# Patient Record
Sex: FEMALE | Race: WHITE | NOT HISPANIC OR LATINO | Employment: OTHER | URBAN - METROPOLITAN AREA
[De-identification: names, ages, dates, MRNs, and addresses within clinical notes are randomized per-mention and may not be internally consistent; named-entity substitution may affect disease eponyms.]

---

## 2017-01-03 DIAGNOSIS — F43.9 SITUATIONAL STRESS: ICD-10-CM

## 2017-01-03 RX ORDER — CLONAZEPAM 1 MG/1
TABLET ORAL
Qty: 75 TABLET | Refills: 0 | OUTPATIENT
Start: 2017-01-03 | End: 2017-02-02 | Stop reason: SDUPTHER

## 2017-01-12 DIAGNOSIS — F43.9 SITUATIONAL STRESS: ICD-10-CM

## 2017-01-12 RX ORDER — CARBAMAZEPINE 200 MG/1
TABLET ORAL
Qty: 60 TABLET | Refills: 4 | Status: SHIPPED | OUTPATIENT
Start: 2017-01-12 | End: 2017-06-06 | Stop reason: SDUPTHER

## 2017-01-24 ENCOUNTER — LAB (OUTPATIENT)
Dept: LAB | Facility: HOSPITAL | Age: 62
End: 2017-01-24

## 2017-01-24 DIAGNOSIS — D50.8 IRON DEFICIENCY ANEMIA SECONDARY TO INADEQUATE DIETARY IRON INTAKE: ICD-10-CM

## 2017-01-24 DIAGNOSIS — D64.9 ANEMIA, UNSPECIFIED TYPE: ICD-10-CM

## 2017-01-24 DIAGNOSIS — G89.4 CHRONIC PAIN SYNDROME: ICD-10-CM

## 2017-01-24 LAB
DEPRECATED RDW RBC AUTO: 59.4 FL (ref 37–49)
ERYTHROCYTE [DISTWIDTH] IN BLOOD BY AUTOMATED COUNT: 16.7 % (ref 11.7–14.5)
FERRITIN SERPL-MCNC: 209.1 NG/ML (ref 11–207)
HCT VFR BLD AUTO: 42.6 % (ref 34–45)
HGB BLD-MCNC: 13.9 G/DL (ref 11.5–14.9)
HGB RETIC QN: 37.7 PG (ref 29.8–36.1)
IMM RETICS NFR: 5.2 % (ref 3–15.8)
IRON 24H UR-MRATE: 127 MCG/DL (ref 37–145)
IRON SATN MFR SERPL: 40 % (ref 14–48)
MCH RBC QN AUTO: 31.2 PG (ref 27–33)
MCHC RBC AUTO-ENTMCNC: 32.6 G/DL (ref 32–35)
MCV RBC AUTO: 95.7 FL (ref 83–97)
PLATELET # BLD AUTO: 214 10*3/MM3 (ref 150–375)
PMV BLD AUTO: 9.8 FL (ref 8.9–12.1)
RBC # BLD AUTO: 4.45 10*6/MM3 (ref 3.9–5)
RETICS/RBC NFR AUTO: 1.14 % (ref 0.6–2)
TIBC SERPL-MCNC: 314 MCG/DL (ref 249–505)
TRANSFERRIN SERPL-MCNC: 224 MG/DL (ref 200–360)
WBC NRBC COR # BLD: 4.39 10*3/MM3 (ref 4–10)

## 2017-01-24 PROCEDURE — 84466 ASSAY OF TRANSFERRIN: CPT | Performed by: INTERNAL MEDICINE

## 2017-01-24 PROCEDURE — 36415 COLL VENOUS BLD VENIPUNCTURE: CPT | Performed by: INTERNAL MEDICINE

## 2017-01-24 PROCEDURE — 82728 ASSAY OF FERRITIN: CPT | Performed by: INTERNAL MEDICINE

## 2017-01-24 PROCEDURE — 85046 RETICYTE/HGB CONCENTRATE: CPT | Performed by: INTERNAL MEDICINE

## 2017-01-24 PROCEDURE — 83540 ASSAY OF IRON: CPT | Performed by: INTERNAL MEDICINE

## 2017-01-24 PROCEDURE — 85027 COMPLETE CBC AUTOMATED: CPT | Performed by: INTERNAL MEDICINE

## 2017-01-24 NOTE — TELEPHONE ENCOUNTER
----- Message from Tracy Deluna sent at 1/24/2017 11:20 AM EST -----  Refill on pain meds   hydrocodone

## 2017-01-25 RX ORDER — HYDROCODONE BITARTRATE AND ACETAMINOPHEN 10; 325 MG/1; MG/1
1 TABLET ORAL EVERY 4 HOURS PRN
Qty: 160 TABLET | Refills: 0 | Status: SHIPPED | OUTPATIENT
Start: 2017-01-25 | End: 2017-02-13 | Stop reason: SDUPTHER

## 2017-01-31 ENCOUNTER — APPOINTMENT (OUTPATIENT)
Dept: LAB | Facility: HOSPITAL | Age: 62
End: 2017-01-31

## 2017-01-31 ENCOUNTER — OFFICE VISIT (OUTPATIENT)
Dept: ONCOLOGY | Facility: CLINIC | Age: 62
End: 2017-01-31

## 2017-01-31 VITALS
WEIGHT: 179.2 LBS | HEIGHT: 64 IN | DIASTOLIC BLOOD PRESSURE: 70 MMHG | HEART RATE: 62 BPM | TEMPERATURE: 98.1 F | BODY MASS INDEX: 30.59 KG/M2 | RESPIRATION RATE: 16 BRPM | SYSTOLIC BLOOD PRESSURE: 110 MMHG

## 2017-01-31 DIAGNOSIS — D50.8 IRON DEFICIENCY ANEMIA SECONDARY TO INADEQUATE DIETARY IRON INTAKE: Primary | ICD-10-CM

## 2017-01-31 PROCEDURE — 99213 OFFICE O/P EST LOW 20 MIN: CPT | Performed by: INTERNAL MEDICINE

## 2017-01-31 PROCEDURE — G0463 HOSPITAL OUTPT CLINIC VISIT: HCPCS | Performed by: INTERNAL MEDICINE

## 2017-01-31 NOTE — PROGRESS NOTES
Subjective .     REASON FOR FOLLOWUP :   Iron deficiency anemia    HISTORY OF PRESENT ILLNESS:  The patient is a 61 y.o. year old female  who is here for follow-up with the above-mentioned history.    Denies chest pain.  Denies shortness of air.  No change in baseline dizziness.  Energy level improved some after IV iron.  Denies bleeding from any location.      Past Medical History   Diagnosis Date   • Anemia    • Anxiety    • Arthritis    • Atrial fibrillation    • Bipolar disorder    • Bleeding disorder    • Bleeding nose    • Cataract    • Chronic fatigue    • Diabetes mellitus      treatment no longer needed for DM since weight loss surgery, diet controlled   • Diverticulitis    • Gallbladder disease    • Gastric ulcer    • GERD (gastroesophageal reflux disease)    • H/O seasonal allergies    • H/O transfusion of packed red blood cells    • Hiatal hernia    • History of CT scan of abdomen 05/18/2009     NO SIGNIFICAN FINDINGS, POST CHOLECYSTECTOMY, PREVIOUS GASTRIC BYPASS AND HERNIA REPAIR   • Hyperlipidemia    • Hypertension    • Peptic ulceration 2013   • Pleuritic chest pain    • Sinusitis    • Sleep apnea    • Stroke    • TIA (transient ischemic attack)    • Vitamin D deficiency      Past Surgical History   Procedure Laterality Date   • Upper gastrointestinal endoscopy  05/06/2015     PERFORMED BY DR. ROBYN LOPEZ   • Gastric bypass  2002     03/01/2014   • Cataract extraction  2010   • Colonoscopy  07/24/2009     TICS, NBIH   • Endoscopy  07/24/2009     GASTRIC BYPASS W/ MEDIUM POUCH AND INTACT STAPLE LINE, GASTRIC ULCER W/ CLEAN BASE   • Bilateral breast reduction  1976   • Hip fracture surgery  10/01/2009   • Endoscopy N/A 4/8/2016     Procedure: ESOPHAGOGASTRODUODENOSCOPY WITH BALLOON DILATION  15-18 MM   AND  BIOPSIES;  Surgeon: Robyn Lopez MD;  Location: [facility] ENDOSCOPY;  Service:    • Hernia repair  2008; 2014     Dr. Madera   • Cholecystectomy  2005   • Tonsillectomy  1985   • Joint replacement  Right 06/02/2011     knee   • Joint replacement       2009/2010       HEMATOLOGIC/ONCOLOGIC HISTORY:  (History from previous dates can be found in the separate document.)    MEDICATIONS    Current Outpatient Prescriptions:   •  B Complex-C (SUPER B COMPLEX PO), Take  by mouth Daily., Disp: , Rfl:   •  cholecalciferol (VITAMIN D3) 400 UNITS tablet, Take 400 Units by mouth Daily., Disp: , Rfl:   •  clonazePAM (KlonoPIN) 1 MG tablet, TAKE ONE TABLET BY MOUTH THREE TIMES A DAY AS NEEDED FOR SEIZURES, Disp: 75 tablet, Rfl: 0  •  clopidogrel (PLAVIX) 75 MG tablet, TAKE ONE TABLET BY MOUTH DAILY, Disp: 30 tablet, Rfl: 4  •  docusate sodium (COLACE) 100 MG capsule, Take 100 mg by mouth 3 (three) times a day as needed for constipation., Disp: , Rfl:   •  EPITOL 200 MG tablet, TAKE ONE TABLET BY MOUTH TWICE A DAY, Disp: 60 tablet, Rfl: 4  •  FLUoxetine (PROZAC) 20 MG capsule, Take 3 capsules by mouth Daily., Disp: 90 capsule, Rfl: 3  •  HYDROcodone-acetaminophen (NORCO)  MG per tablet, Take 1 tablet by mouth Every 4 (Four) Hours As Needed for moderate pain (4-6)., Disp: 160 tablet, Rfl: 0  •  meloxicam (MOBIC) 7.5 MG tablet, Take 1 tablet by mouth daily., Disp: 60 tablet, Rfl: 5  •  metoprolol tartrate (LOPRESSOR) 25 MG tablet, TAKE ONE TABLET BY MOUTH TWICE A DAY, Disp: 180 tablet, Rfl: 1  •  naproxen sodium (ALEVE) 220 MG tablet, Take 220 mg by mouth 2 (Two) Times a Day As Needed for mild pain (1-3)., Disp: , Rfl:   •  promethazine (PHENERGAN) 25 MG tablet, Take  by mouth., Disp: , Rfl:   •  sore muscle (ICY HOT EXTRA STRENGTH) 10-30 % cream cream, Apply  topically As Needed., Disp: , Rfl:     ALLERGIES:   No Known Allergies    SOCIAL HISTORY:       Social History     Social History   • Marital status:      Spouse name: Rah   • Number of children: N/A   • Years of education: College     Occupational History   • [employer]     Social History Main Topics   • Smoking status: Never Smoker   •  "Smokeless tobacco: Never Used   • Alcohol use Yes      Comment: social   • Drug use: No   • Sexual activity: Defer     Other Topics Concern   • Not on file     Social History Narrative         FAMILY HISTORY:  Family History   Problem Relation Age of Onset   • Colon cancer Mother 74   • Other Mother      POLYP   • Colon cancer Father 69   • Other Father      POLYP   • Diabetes Father    • Other Sister 52     POLYP   • Colon cancer Paternal Grandfather    • Cancer Paternal Grandfather    • Lung cancer Maternal Grandfather    • Prostate cancer Maternal Grandfather    • Cancer Maternal Grandfather    • Cancer Maternal Uncle        REVIEW OF SYSTEMS:  GENERAL: No change in appetite or weight;   No fevers, chills, sweats.    SKIN: No nonhealing lesions.   No rashes.  HEME/LYMPH: No easy bruising, bleeding.   No swollen nodes.   EYES: No vision changes or diplopia.   ENT: No tinnitus, hearing loss, gum bleeding, epistaxis, hoarseness or dysphagia.   RESPIRATORY: No cough, shortness of breath, hemoptysis or wheezing.   CVS: No chest pain, palpitations, orthopnea, dyspnea on exertion or PND.   GI: No melena or hematochezia.   No abdominal pain.  No nausea, vomiting, constipation, diarrhea  : No lower tract obstructive symptoms, dysuria or hematuria.   MUSCULOSKELETAL: No bone pain.  No joint stiffness.   NEUROLOGICAL: see HPI   PSYCHIATRIC: No increased nervousness, mood changes or depression.      Objective    Vitals:    01/31/17 1052   BP: 110/70   Pulse: 62   Resp: 16   Temp: 98.1 °F (36.7 °C)   Weight: 179 lb 3.2 oz (81.3 kg)   Height: 63.78\" (162 cm)   PainSc: 7  Comment: pain comes from her hips     Current Status 1/31/2017   ECOG score 0      PHYSICAL EXAM:    GENERAL:  Well-developed, well-nourished in no acute distress.   SKIN:  Scabbed areas on her body.    EYES:  Pupils equal, round and reactive to light.  EOMs intact.  Conjunctivae normal.  EARS:  Hearing intact.  NOSE:  Septum midline.  No excoriations or " nasal discharge.  MOUTH:  Tongue is well-papillated; no stomatitis or ulcers.  Lips normal.  THROAT:  Oropharynx without lesions or exudates.  NECK:  Supple with good range of motion; no thyromegaly or masses, no JVD.  LYMPHATICS:  No cervical, supraclavicular, axillary or inguinal adenopathy.  CHEST:  Lungs clear to auscultation. Good airflow.  CARDIAC:  Regular rate and rhythm without murmurs, rubs or gallops. Normal S1,S2.  ABDOMEN:  Soft, nontender with no hepatosplenomegaly or masses.  EXTREMITIES:  No clubbing, cyanosis or edema.  NEUROLOGICAL:  Cranial Nerves II-XII grossly intact.  No focal neurological deficits.  PSYCHIATRIC:  Normal affect and mood.    RECENT LABS:        WBC   Date Value Ref Range Status   01/24/2017 4.39 4.00 - 10.00 10*3/mm3 Final   12/06/2016 4.64 4.00 - 10.00 10*3/mm3 Final   09/15/2016 5.78 4.50 - 10.70 10*3/mm3 Final   03/02/2016 4.31 (L) 4.50 - 10.70 K/Cumm Final   03/01/2016 5.53 4.50 - 10.70 K/Cumm Final   02/29/2016 6.55 4.50 - 10.70 K/Cumm Final   10/07/2015 8.28 4.50 - 10.70 K/Cumm Final   05/06/2015 5.82 4.50 - 10.70 K/Cumm Final   05/05/2015 6.17 4.50 - 10.70 K/Cumm Final   05/03/2015 5.68 4.50 - 10.70 K/Cumm Final   05/02/2015 5.84 4.50 - 10.70 K/Cumm Final     HEMOGLOBIN   Date Value Ref Range Status   01/24/2017 13.9 11.5 - 14.9 g/dL Final   12/06/2016 10.5 (L) 11.5 - 14.9 g/dL Final   09/15/2016 11.7 (L) 11.9 - 15.5 g/dL Final   03/02/2016 10.6 (L) 11.9 - 15.5 g/dL Final   03/01/2016 10.7 (L) 11.9 - 15.5 g/dL Final   02/29/2016 11.2 (L) 11.9 - 15.5 g/dL Final   10/07/2015 10.4 (L) 11.9 - 15.5 g/dL Final   05/06/2015 11.6 (L) 11.9 - 15.5 g/dL Final   05/05/2015 11.4 (L) 11.9 - 15.5 g/dL Final   05/03/2015 10.0 (L) 11.9 - 15.5 g/dL Final   05/02/2015 10.3 (L) 11.9 - 15.5 g/dL Final     PLATELETS   Date Value Ref Range Status   01/24/2017 214 150 - 375 10*3/mm3 Final   12/06/2016 319 150 - 375 10*3/mm3 Final   09/15/2016 343 140 - 500 10*3/mm3 Final   03/02/2016 287 140 -  500 K/Cumm Final   03/01/2016 324 140 - 500 K/Cumm Final   02/29/2016 388 140 - 500 K/Cumm Final   10/07/2015 254 140 - 500 K/Cumm Final   05/06/2015 295 140 - 500 K/Cumm Final   05/05/2015 316 140 - 500 K/Cumm Final   05/03/2015 267 140 - 500 K/Cumm Final   05/02/2015 253 140 - 500 K/Cumm Final       Assessment/Plan   There are no diagnoses linked to this encounter.  1.  Iron deficiency anemia.  Cannot tolerate oral iron due to constipation.  Likely poor absorption due to gastric bypass 2003.   2 doses Feraheme December 2016 improved energy some.  Recent iron labs and hemoglobin normal.    2.  Source of iron deficiency.  Suspected to be due to poor absorption from gastric bypass 2003.  She sees Dr. Lopez/Kym regularly.  States she is up-to-date on EGD and colonoscopy.    3.  Gastric bypass 2003.  Anemia could be multifactorial.  B12 normal today.  Await folate.    4.  On a prior visit, she had skin lesions due to picking at her skin as she was nervous for the initial consult.  No complaints of this today.  I noticed no skin problems today.     Plan  M.D. 4 months with iron labs 1 week prior

## 2017-02-02 DIAGNOSIS — F43.9 SITUATIONAL STRESS: ICD-10-CM

## 2017-02-03 RX ORDER — CLONAZEPAM 1 MG/1
TABLET ORAL
Qty: 75 TABLET | Refills: 0 | OUTPATIENT
Start: 2017-02-03 | End: 2017-02-13 | Stop reason: SDUPTHER

## 2017-02-06 RX ORDER — CLOPIDOGREL BISULFATE 75 MG/1
TABLET ORAL
Qty: 30 TABLET | Refills: 3 | Status: SHIPPED | OUTPATIENT
Start: 2017-02-06 | End: 2017-06-15 | Stop reason: SDUPTHER

## 2017-02-13 ENCOUNTER — OFFICE VISIT (OUTPATIENT)
Dept: FAMILY MEDICINE CLINIC | Facility: CLINIC | Age: 62
End: 2017-02-13

## 2017-02-13 VITALS
WEIGHT: 180 LBS | HEART RATE: 50 BPM | TEMPERATURE: 97.7 F | BODY MASS INDEX: 30.73 KG/M2 | SYSTOLIC BLOOD PRESSURE: 118 MMHG | DIASTOLIC BLOOD PRESSURE: 68 MMHG | OXYGEN SATURATION: 97 % | HEIGHT: 64 IN

## 2017-02-13 DIAGNOSIS — E55.9 VITAMIN D DEFICIENCY: ICD-10-CM

## 2017-02-13 DIAGNOSIS — R53.82 CHRONIC FATIGUE: ICD-10-CM

## 2017-02-13 DIAGNOSIS — D50.0 IRON DEFICIENCY ANEMIA DUE TO CHRONIC BLOOD LOSS: ICD-10-CM

## 2017-02-13 DIAGNOSIS — I10 ESSENTIAL HYPERTENSION: ICD-10-CM

## 2017-02-13 DIAGNOSIS — E11.9 CONTROLLED TYPE 2 DIABETES MELLITUS WITHOUT COMPLICATION, WITHOUT LONG-TERM CURRENT USE OF INSULIN (HCC): ICD-10-CM

## 2017-02-13 DIAGNOSIS — E78.5 HYPERLIPIDEMIA, UNSPECIFIED HYPERLIPIDEMIA TYPE: Primary | ICD-10-CM

## 2017-02-13 DIAGNOSIS — Z79.899 ENCOUNTER FOR LONG-TERM (CURRENT) USE OF MEDICATIONS: ICD-10-CM

## 2017-02-13 DIAGNOSIS — F43.9 SITUATIONAL STRESS: ICD-10-CM

## 2017-02-13 DIAGNOSIS — I48.91 ATRIAL FIBRILLATION, UNSPECIFIED TYPE (HCC): Primary | ICD-10-CM

## 2017-02-13 DIAGNOSIS — G89.4 CHRONIC PAIN SYNDROME: ICD-10-CM

## 2017-02-13 DIAGNOSIS — K21.9 GASTROESOPHAGEAL REFLUX DISEASE WITHOUT ESOPHAGITIS: ICD-10-CM

## 2017-02-13 DIAGNOSIS — E78.49 OTHER HYPERLIPIDEMIA: ICD-10-CM

## 2017-02-13 DIAGNOSIS — R53.83 OTHER FATIGUE: ICD-10-CM

## 2017-02-13 PROCEDURE — 96372 THER/PROPH/DIAG INJ SC/IM: CPT | Performed by: INTERNAL MEDICINE

## 2017-02-13 PROCEDURE — 99214 OFFICE O/P EST MOD 30 MIN: CPT | Performed by: INTERNAL MEDICINE

## 2017-02-13 RX ORDER — KETOROLAC TROMETHAMINE 30 MG/ML
60 INJECTION, SOLUTION INTRAMUSCULAR; INTRAVENOUS ONCE
Status: COMPLETED | OUTPATIENT
Start: 2017-02-13 | End: 2017-02-13

## 2017-02-13 RX ORDER — ZOLPIDEM TARTRATE 5 MG/1
5 TABLET ORAL NIGHTLY PRN
Qty: 5 TABLET | Refills: 0 | Status: SHIPPED | OUTPATIENT
Start: 2017-02-13 | End: 2017-03-02 | Stop reason: SDUPTHER

## 2017-02-13 RX ORDER — CLONAZEPAM 1 MG/1
TABLET ORAL
Qty: 75 TABLET | Refills: 0 | Status: SHIPPED | OUTPATIENT
Start: 2017-02-13 | End: 2017-03-02 | Stop reason: SDUPTHER

## 2017-02-13 RX ORDER — HYDROCODONE BITARTRATE AND ACETAMINOPHEN 10; 325 MG/1; MG/1
1 TABLET ORAL EVERY 4 HOURS PRN
Qty: 160 TABLET | Refills: 0 | Status: SHIPPED | OUTPATIENT
Start: 2017-02-13 | End: 2017-04-24 | Stop reason: SDUPTHER

## 2017-02-13 RX ORDER — KETOROLAC TROMETHAMINE 30 MG/ML
30 INJECTION, SOLUTION INTRAMUSCULAR; INTRAVENOUS ONCE
Qty: 1 ML | Refills: 0 | Status: SHIPPED | OUTPATIENT
Start: 2017-02-13 | End: 2017-02-13

## 2017-02-13 RX ORDER — HYDROCODONE BITARTRATE AND ACETAMINOPHEN 10; 325 MG/1; MG/1
1 TABLET ORAL EVERY 4 HOURS PRN
Qty: 160 TABLET | Refills: 0 | Status: SHIPPED | OUTPATIENT
Start: 2017-02-13 | End: 2017-02-13 | Stop reason: SDUPTHER

## 2017-02-13 RX ADMIN — KETOROLAC TROMETHAMINE 60 MG: 30 INJECTION, SOLUTION INTRAMUSCULAR; INTRAVENOUS at 17:17

## 2017-02-13 NOTE — PROGRESS NOTES
Milady Wilson is a 61 y.o. female   Chief Complaint   Patient presents with   • Diabetes     3 month check up        BONNY Pizano: Per House Bill #1 Requirements and [place] Board of Medical Licensure Regulations for prescribing of Schedule II and Schedule III with Hydrocodone, and other controlled medications for which the Board requires [facility] reporting and regulation, the following drug treatment plan was developed and reviewed with the patient on the date of this encounter:              Controlled medication(s) taken: Norco 10, Klonipen 1mg, and Ambien (small amount)          Medical Indication (including pain relief and/or other physical and psychoosocial functional issue) for treatment: Pain, Anxiety, Insomnia          Further Diagnostic tests, consultations, or treatments needed: Labs ordered today            Plans for review of plan, adjustment and waning dose and further [facility] evaluation include:Quarterly [facility]                  Risk for medication abuse for this patient based on physician review is felt to be extremely low.    Subjective   History of Present Illness     Milady Wilson is a 61 y.o.female who presents with:follow up on anemia.  Had IV iron and feels like it has helped her a lot.  Felt a little better but does feel some better.  Can not sleep well at night.  Wants Ambien 5 mg #5 per month so she can occasionally get a good nights sleep.      The following portions of the patient's history were reviewed and updated as appropriate: past medical history, surgeries, family history, allergies, current medications, past social history and problem list.    A comprehensive review of 14 systems was peformed  Review of Systems   Constitutional: Negative.  Negative for chills, fatigue, fever and unexpected weight change.   HENT: Negative.  Negative for ear pain, hearing loss, sinus pressure, sore throat and tinnitus.    Eyes: Negative.  Negative for pain, discharge and redness.  "  Respiratory: Negative.  Negative for cough, shortness of breath and wheezing.    Cardiovascular: Negative.  Negative for chest pain, palpitations and leg swelling.   Gastrointestinal: Negative.  Negative for abdominal pain, constipation, diarrhea and nausea.   Endocrine: Negative.  Negative for cold intolerance and heat intolerance.   Genitourinary: Negative.  Negative for difficulty urinating, flank pain and urgency.   Musculoskeletal: Negative.  Negative for back pain, joint swelling and myalgias.   Skin: Negative.  Negative for rash and wound.   Allergic/Immunologic: Negative.  Negative for environmental allergies and food allergies.   Neurological: Negative.  Negative for dizziness, seizures, numbness and headaches.   Hematological: Negative.  Negative for adenopathy. Does not bruise/bleed easily.   Psychiatric/Behavioral: Negative.  Negative for decreased concentration, dysphoric mood and sleep disturbance. The patient is not nervous/anxious.    All other systems reviewed and are negative.      I have reviewed the patient's medical history in detail and updated the computerized patient record.      Objective   Vitals:    02/13/17 1612   BP: 118/68   BP Location: Right arm   Patient Position: Sitting   Cuff Size: Adult   Pulse: 50   Temp: 97.7 °F (36.5 °C)   TempSrc: Oral   SpO2: 97%   Weight: 180 lb (81.6 kg)   Height: 63.78\" (162 cm)   PainSc:   8   PainLoc: Hip         Physical Exam   Constitutional: She appears well-developed and well-nourished.   HENT:   Head: Normocephalic and atraumatic.   Right Ear: External ear normal.   Left Ear: External ear normal.   Nose: Nose normal.   Mouth/Throat: Oropharynx is clear and moist.   Eyes: Conjunctivae and EOM are normal. Pupils are equal, round, and reactive to light.   Neck: Normal range of motion. Neck supple.   Cardiovascular: Normal rate, regular rhythm, normal heart sounds and intact distal pulses.    Pulmonary/Chest: Effort normal and breath sounds normal. "   Abdominal: Soft. Bowel sounds are normal.   Musculoskeletal: Normal range of motion.   Neurological: She is alert. She has normal reflexes.   Skin: Skin is warm and dry.   Color looks better   Psychiatric: She has a normal mood and affect. Her behavior is normal. Judgment and thought content normal.   Nursing note and vitals reviewed.      Procedures    Reviewed old notes from [facility]                        Assessment/Plan     Diagnoses and all orders for this visit:    Atrial fibrillation, unspecified type  Comments:  Intermittent ongoing  Follow up as planned    Chronic pain syndrome  Comments:  refill pain med  BONNY reviewed  Orders:  -     Discontinue: HYDROcodone-acetaminophen (NORCO)  MG per tablet; Take 1 tablet by mouth Every 4 (Four) Hours As Needed for moderate pain (4-6).  -     HYDROcodone-acetaminophen (NORCO)  MG per tablet; Take 1 tablet by mouth Every 4 (Four) Hours As Needed for moderate pain (4-6).    Situational stress  Comments:  Klonipen prn  Monnitor closely    Orders:  -     clonazePAM (KlonoPIN) 1 MG tablet; Take one three times daily as needed.  #75=1 month supply    Controlled type 2 diabetes mellitus without complication, without long-term current use of insulin  Comments:  Continue same meds  Follow up as planned    Other fatigue  Comments:  Follow up as planneed  Doiing well at present time    Other hyperlipidemia  Comments:  check labs  Encouraged food sdieting    Gastroesophageal reflux disease without esophagitis  Comments:  Stable at present time    Other orders  -     ketorolac (TORADOL) 30 MG/ML injection; Infuse 30 mg into a venous catheter 1 (One) Time for 1 dose.  -     zolpidem (AMBIEN) 5 MG tablet; Take 1 tablet by mouth At Night As Needed for sleep.         Jackson Parkinson MD  2/13/2017  4:16 PM

## 2017-02-13 NOTE — PATIENT INSTRUCTIONS
Diagnoses and all orders for this visit:    Atrial fibrillation, unspecified type  Comments:  Intermittent ongoing  Follow up as planned    Chronic pain syndrome  Comments:  refill pain med  BONNY reviewed  Orders:  -     Discontinue: HYDROcodone-acetaminophen (NORCO)  MG per tablet; Take 1 tablet by mouth Every 4 (Four) Hours As Needed for moderate pain (4-6).  -     HYDROcodone-acetaminophen (NORCO)  MG per tablet; Take 1 tablet by mouth Every 4 (Four) Hours As Needed for moderate pain (4-6).    Situational stress  Comments:  Klonipen prn  Monnitor closely    Orders:  -     clonazePAM (KlonoPIN) 1 MG tablet; Take one three times daily as needed.  #75=1 month supply    Controlled type 2 diabetes mellitus without complication, without long-term current use of insulin  Comments:  Continue same meds  Follow up as planned    Other fatigue  Comments:  Follow up as planneed  Doiing well at present time    Other hyperlipidemia  Comments:  check labs  Encouraged food sdieting    Gastroesophageal reflux disease without esophagitis  Comments:  Stable at present time    Other orders  -     ketorolac (TORADOL) 30 MG/ML injection; Infuse 30 mg into a venous catheter 1 (One) Time for 1 dose.  -     zolpidem (AMBIEN) 5 MG tablet; Take 1 tablet by mouth At Night As Needed for sleep.

## 2017-02-15 LAB
25(OH)D3+25(OH)D2 SERPL-MCNC: 27.9 NG/ML (ref 30–100)
ALBUMIN SERPL-MCNC: 4.4 G/DL (ref 3.5–5.2)
ALBUMIN/GLOB SERPL: 1.6 G/DL
ALP SERPL-CCNC: 104 U/L (ref 39–117)
ALT SERPL-CCNC: 178 U/L (ref 1–33)
AST SERPL-CCNC: 484 U/L (ref 1–32)
BILIRUB SERPL-MCNC: 0.4 MG/DL (ref 0.1–1.2)
BUN SERPL-MCNC: 21 MG/DL (ref 6–20)
BUN/CREAT SERPL: 18.1 (ref 7–25)
CALCIUM SERPL-MCNC: 10.1 MG/DL (ref 8.6–10.5)
CHLORIDE SERPL-SCNC: 100 MMOL/L (ref 98–107)
CHOLEST SERPL-MCNC: 218 MG/DL (ref 0–200)
CO2 SERPL-SCNC: 25.6 MMOL/L (ref 22–29)
CREAT SERPL-MCNC: 1.16 MG/DL (ref 0.57–1)
FT4I SERPL CALC-MCNC: 1.4 (ref 1.2–4.9)
GLOBULIN SER CALC-MCNC: 2.7 GM/DL
GLUCOSE SERPL-MCNC: 87 MG/DL (ref 65–99)
HDLC SERPL-MCNC: 68 MG/DL (ref 40–60)
LDLC SERPL CALC-MCNC: 104 MG/DL (ref 0–100)
LDLC/HDLC SERPL: 1.53 {RATIO}
POTASSIUM SERPL-SCNC: 5.2 MMOL/L (ref 3.5–5.2)
PROT SERPL-MCNC: 7.1 G/DL (ref 6–8.5)
SODIUM SERPL-SCNC: 143 MMOL/L (ref 136–145)
T3RU NFR SERPL: 27 % (ref 24–39)
T4 SERPL-MCNC: 5 UG/DL (ref 4.5–12)
TRIGL SERPL-MCNC: 231 MG/DL (ref 0–150)
TSH SERPL DL<=0.005 MIU/L-ACNC: 3.71 UIU/ML (ref 0.45–4.5)
VLDLC SERPL CALC-MCNC: 46.2 MG/DL (ref 5–40)

## 2017-02-24 DIAGNOSIS — E11.9 CONTROLLED TYPE 2 DIABETES MELLITUS WITHOUT COMPLICATION, WITHOUT LONG-TERM CURRENT USE OF INSULIN (HCC): ICD-10-CM

## 2017-02-24 DIAGNOSIS — R79.89 ABNORMAL LIVER FUNCTION TESTS: Primary | ICD-10-CM

## 2017-02-24 DIAGNOSIS — Z79.899 ENCOUNTER FOR LONG-TERM (CURRENT) USE OF MEDICATIONS: ICD-10-CM

## 2017-02-27 ENCOUNTER — TELEPHONE (OUTPATIENT)
Dept: FAMILY MEDICINE CLINIC | Facility: CLINIC | Age: 62
End: 2017-02-27

## 2017-02-27 NOTE — TELEPHONE ENCOUNTER
Patient called, again re. Need for Stat CMP and Acetaminophen level to go to nearest [facility] and have these done patient called Fri. 2/24/17 @ 5:47 no answer left message on voicemail to go to [facility] and have stat labs done explained on message these needed to be done ASAP or STAT

## 2017-02-28 ENCOUNTER — TELEPHONE (OUTPATIENT)
Dept: FAMILY MEDICINE CLINIC | Facility: CLINIC | Age: 62
End: 2017-02-28

## 2017-02-28 NOTE — TELEPHONE ENCOUNTER
----- Message from Jaclyn Zepeda sent at 2/28/2017  3:04 PM EST -----  Regarding: rx request  promethazine (PHENERGAN) 25 MG table   and   Diverticulitis anti-biotic (pt did not supply name)     lov 2/13/17  nkda  Send to     [address] AT [address] [place] [address] [phone] [facility] [phone]

## 2017-03-02 DIAGNOSIS — F43.9 SITUATIONAL STRESS: ICD-10-CM

## 2017-03-02 RX ORDER — ZOLPIDEM TARTRATE 5 MG/1
TABLET ORAL
Qty: 5 TABLET | Refills: 0 | Status: SHIPPED | OUTPATIENT
Start: 2017-03-02 | End: 2017-04-29 | Stop reason: DRUGHIGH

## 2017-03-02 RX ORDER — CLONAZEPAM 1 MG/1
TABLET ORAL
Qty: 75 TABLET | Refills: 0 | OUTPATIENT
Start: 2017-03-02 | End: 2017-04-29 | Stop reason: SDUPTHER

## 2017-03-06 LAB
ALBUMIN SERPL-MCNC: 4.1 G/DL (ref 3.5–5.5)
ALBUMIN/GLOB SERPL: 1.6 {RATIO} (ref 1.1–2.5)
ALP SERPL-CCNC: 101 IU/L (ref 39–117)
ALT SERPL-CCNC: 83 IU/L (ref 0–32)
APAP SERPL-MCNC: NEGATIVE UG/ML (ref 10–30)
AST SERPL-CCNC: 51 IU/L (ref 0–40)
BILIRUB SERPL-MCNC: <0.2 MG/DL (ref 0–1.2)
BUN SERPL-MCNC: 15 MG/DL (ref 6–24)
BUN/CREAT SERPL: 23 (ref 9–23)
CALCIUM SERPL-MCNC: 10 MG/DL (ref 8.7–10.2)
CHLORIDE SERPL-SCNC: 100 MMOL/L (ref 96–106)
CO2 SERPL-SCNC: 23 MMOL/L (ref 18–29)
CREAT SERPL-MCNC: 0.66 MG/DL (ref 0.57–1)
GLOBULIN SER CALC-MCNC: 2.5 G/DL (ref 1.5–4.5)
GLUCOSE SERPL-MCNC: 110 MG/DL (ref 65–99)
POTASSIUM SERPL-SCNC: 5.2 MMOL/L (ref 3.5–5.2)
PROT SERPL-MCNC: 6.6 G/DL (ref 6–8.5)
SODIUM SERPL-SCNC: 142 MMOL/L (ref 134–144)

## 2017-04-17 DIAGNOSIS — F31.11 BIPOLAR AFFECTIVE DISORDER, CURRENTLY MANIC, MILD (HCC): ICD-10-CM

## 2017-04-17 RX ORDER — FLUOXETINE HYDROCHLORIDE 20 MG/1
CAPSULE ORAL
Qty: 90 CAPSULE | Refills: 0 | Status: SHIPPED | OUTPATIENT
Start: 2017-04-17 | End: 2017-05-23 | Stop reason: SDUPTHER

## 2017-04-24 ENCOUNTER — TELEPHONE (OUTPATIENT)
Dept: FAMILY MEDICINE CLINIC | Facility: CLINIC | Age: 62
End: 2017-04-24

## 2017-04-24 DIAGNOSIS — G89.4 CHRONIC PAIN SYNDROME: ICD-10-CM

## 2017-04-24 RX ORDER — HYDROCODONE BITARTRATE AND ACETAMINOPHEN 10; 325 MG/1; MG/1
1 TABLET ORAL EVERY 4 HOURS PRN
Qty: 160 TABLET | Refills: 0 | Status: SHIPPED | OUTPATIENT
Start: 2017-04-24 | End: 2017-04-29 | Stop reason: SDUPTHER

## 2017-04-24 NOTE — TELEPHONE ENCOUNTER
----- Message from Kaycee Argueta sent at 4/24/2017 11:06 AM EDT -----  Regarding: SCRIPT  Contact: [phone]  LDS: 2/13/17  NEXT APPT: 6/5/17    PATIENT NEEDS NEW SCRIPT FOR HYDROcodone-acetaminophen (NORCO)  MG per tablet. THERE IS ALSO ANOTHER MESSAGE FOR DR OLIVAS TO GIVE HER A CALL.    PATIENT INFORMED TO GIVE  24 HRS FOR SCRIPT.    THANK  YOU  Last office visit 2/13/17  Last fill 2/13/17  Last kimberley 2/14/17  Next appt6/5/17

## 2017-04-24 NOTE — TELEPHONE ENCOUNTER
----- Message from Kaycee Argueta sent at 4/24/2017 11:06 AM EDT -----  Regarding: SCRIPT  Contact: [phone]  LDS: 2/13/17  NEXT APPT: 6/5/17    PATIENT NEEDS NEW SCRIPT FOR HYDROcodone-acetaminophen (NORCO)  MG per tablet. THERE IS ALSO ANOTHER MESSAGE FOR DR OLIVAS TO GIVE HER A CALL.    PATIENT INFORMED TO GIVE  24 HRS FOR SCRIPT.    THANK  YOU      Patient called had episode where she pasted out hit head went to [facility]

## 2017-04-24 NOTE — TELEPHONE ENCOUNTER
----- Message from Kaycee Argueta sent at 4/24/2017 11:06 AM EDT -----  Regarding: SCRIPT  Contact: [phone]  LDS: 2/13/17  NEXT APPT: 6/5/17    PATIENT NEEDS NEW SCRIPT FOR HYDROcodone-acetaminophen (NORCO)  MG per tablet. THERE IS ALSO ANOTHER MESSAGE FOR DR OLIVAS TO GIVE HER A CALL.    PATIENT INFORMED TO GIVE  24 HRS FOR SCRIPT.    THANK  YOU  Message sent

## 2017-04-24 NOTE — TELEPHONE ENCOUNTER
----- Message from Tracy Deluna sent at 4/24/2017 11:42 AM EDT -----  Patient left [facility]   Against doctors orders  - would like too speak with you      [phone]            Patient called she had a syncope episode fell hit head wants to talk to Dr Pugh  Message sent

## 2017-04-25 NOTE — TELEPHONE ENCOUNTER
Spoke with patient on phone    Left AMA due to md who failed to listen to her    BP drops at times    Jackson Parkinson MD

## 2017-04-29 ENCOUNTER — OFFICE VISIT (OUTPATIENT)
Dept: FAMILY MEDICINE CLINIC | Facility: CLINIC | Age: 62
End: 2017-04-29

## 2017-04-29 VITALS
HEART RATE: 42 BPM | TEMPERATURE: 97.8 F | SYSTOLIC BLOOD PRESSURE: 110 MMHG | DIASTOLIC BLOOD PRESSURE: 74 MMHG | BODY MASS INDEX: 30.59 KG/M2 | WEIGHT: 177 LBS | OXYGEN SATURATION: 98 %

## 2017-04-29 DIAGNOSIS — I48.20 CHRONIC ATRIAL FIBRILLATION (HCC): ICD-10-CM

## 2017-04-29 DIAGNOSIS — E55.9 VITAMIN D DEFICIENCY: ICD-10-CM

## 2017-04-29 DIAGNOSIS — K21.00 GASTROESOPHAGEAL REFLUX DISEASE WITH ESOPHAGITIS: ICD-10-CM

## 2017-04-29 DIAGNOSIS — Z79.899 ENCOUNTER FOR LONG-TERM (CURRENT) USE OF MEDICATIONS: ICD-10-CM

## 2017-04-29 DIAGNOSIS — G89.4 CHRONIC PAIN SYNDROME: ICD-10-CM

## 2017-04-29 DIAGNOSIS — F43.9 SITUATIONAL STRESS: ICD-10-CM

## 2017-04-29 DIAGNOSIS — E11.9 CONTROLLED TYPE 2 DIABETES MELLITUS WITHOUT COMPLICATION, WITHOUT LONG-TERM CURRENT USE OF INSULIN (HCC): ICD-10-CM

## 2017-04-29 DIAGNOSIS — I10 ESSENTIAL HYPERTENSION: ICD-10-CM

## 2017-04-29 DIAGNOSIS — E78.5 HYPERLIPIDEMIA, UNSPECIFIED HYPERLIPIDEMIA TYPE: Primary | ICD-10-CM

## 2017-04-29 DIAGNOSIS — F31.0 BIPOLAR AFFECTIVE DISORDER, CURRENT EPISODE HYPOMANIC (HCC): ICD-10-CM

## 2017-04-29 PROCEDURE — 99214 OFFICE O/P EST MOD 30 MIN: CPT | Performed by: INTERNAL MEDICINE

## 2017-04-29 RX ORDER — CLONAZEPAM 1 MG/1
1 TABLET ORAL 3 TIMES DAILY PRN
Qty: 75 TABLET | Refills: 0 | Status: SHIPPED | OUTPATIENT
Start: 2017-04-29 | End: 2017-05-23 | Stop reason: SDUPTHER

## 2017-04-29 RX ORDER — HYDROCODONE BITARTRATE AND ACETAMINOPHEN 10; 325 MG/1; MG/1
1 TABLET ORAL EVERY 4 HOURS PRN
Qty: 160 TABLET | Refills: 0 | Status: SHIPPED | OUTPATIENT
Start: 2017-04-29 | End: 2017-06-05 | Stop reason: SDUPTHER

## 2017-04-29 RX ORDER — ZOLPIDEM TARTRATE 10 MG/1
10 TABLET ORAL NIGHTLY PRN
Qty: 30 TABLET | Refills: 0 | Status: SHIPPED | OUTPATIENT
Start: 2017-04-29 | End: 2017-05-23 | Stop reason: SDUPTHER

## 2017-04-29 NOTE — PROGRESS NOTES
Milady Wilson is a 61 y.o. female   Chief Complaint   Patient presents with   • Hypertension     pt fell 3 times and had to go to hospt for low bp reading       BONNY Pizano: Per House Bill #1 Requirements and [place] Board of Medical Licensure Regulations for prescribing of Schedule II and Schedule III with Hydrocodone, and other controlled medications for which the Board requires [facility] reporting and regulation, the following drug treatment plan was developed and reviewed with the patient on the date of this encounter:              Controlled medication(s) taken: Norco and Klonipen          Medical Indication (including pain relief and/or other physical and psychoosocial functional issue) for treatment: Pain of back and arms/ anxiety          Further Diagnostic tests, consultations, or treatments needed: none            Plans for review of plan, adjustment and waning dose and further [facility] evaluation include: [facility] quarterly                  Risk for medication abuse for this patient based on physician review is felt to be extremely low.    Subjective   History of Present Illness     Milady Wilson is a 61 y.o.female who presents with:headache and bruised left hip after fall from syncopal episode.  Fell x3.  Went to [facility] and checked out AMA.  Dehydrated and had IVF.  Did not like being in a semi-private room.  Pain is all over.  Out of med and needs RX today.  Feels good at present time    Now doing well at present time.  Still not driving at all.  Feels good at present time.  Doing well at present time.        The following portions of the patient's history were reviewed and updated as appropriate: past medical history, surgeries, family history, allergies, current medications, past social history and problem list.    A comprehensive review of 14 systems was peformed  Review of Systems   Constitutional: Positive for fatigue.   HENT: Negative.    Eyes: Negative.    Respiratory: Negative.     Cardiovascular: Negative.    Gastrointestinal: Negative.    Endocrine: Negative.    Musculoskeletal: Positive for arthralgias, back pain, joint swelling and myalgias.   Allergic/Immunologic: Positive for environmental allergies.   Neurological: Positive for dizziness and light-headedness.   Hematological: Negative.    Psychiatric/Behavioral: Positive for agitation and sleep disturbance.       I have reviewed the patient's medical history in detail and updated the computerized patient record.    Objective   Vitals:    04/29/17 1249   BP: 110/74   BP Location: Left arm   Patient Position: Sitting   Pulse: (!) 42   Temp: 97.8 °F (36.6 °C)   SpO2: 98%   Weight: 177 lb (80.3 kg)   PainSc: 6  Comment: right side   PainLoc: Hip           Physical Exam   Constitutional: She appears well-developed and well-nourished.   HENT:   Head: Normocephalic and atraumatic.   Right Ear: External ear normal.   Left Ear: External ear normal.   Nose: Nose normal.   Mouth/Throat: Oropharynx is clear and moist.   Eyes: Conjunctivae and EOM are normal. Pupils are equal, round, and reactive to light.   Neck: Normal range of motion. Neck supple.   Cardiovascular: Normal rate, regular rhythm, normal heart sounds and intact distal pulses.    Pulmonary/Chest: Effort normal and breath sounds normal.   Abdominal: Soft. Bowel sounds are normal.   Musculoskeletal: Normal range of motion.   Neurological: She is alert. She has normal reflexes.   Skin: Skin is warm and dry.   Psychiatric: She has a normal mood and affect. Her behavior is normal. Judgment and thought content normal.       Procedures                            Assessment/Plan     Diagnoses and all orders for this visit:    Hyperlipidemia, unspecified hyperlipidemia type  Comments:  Now stable at present time  Monitor diet  Follow up as planned  Orders:  -     CBC & Differential  -     Comprehensive Metabolic Panel  -     Thyroid Panel With TSH  -     Lipid Panel With LDL / HDL Ratio  -      Vitamin D 25 hydroxy    Essential hypertension  Comments:  Monitor BP closely  Avoid salt  Orders:  -     CBC & Differential  -     Comprehensive Metabolic Panel  -     Thyroid Panel With TSH  -     Lipid Panel With LDL / HDL Ratio  -     Vitamin D 25 hydroxy    Vitamin D deficiency  Comments:  OTC Vitqamin D 2000 units daily  Orders:  -     CBC & Differential  -     Comprehensive Metabolic Panel  -     Thyroid Panel With TSH  -     Lipid Panel With LDL / HDL Ratio  -     Vitamin D 25 hydroxy    Encounter for long-term (current) use of medications  -     CBC & Differential  -     Comprehensive Metabolic Panel  -     Thyroid Panel With TSH  -     Lipid Panel With LDL / HDL Ratio  -     Vitamin D 25 hydroxy    Bipolar affective disorder, current episode hypomanic  Comments:  Stable at present time    Gastroesophageal reflux disease with esophagitis  Comments:  Stable at present time    Controlled type 2 diabetes mellitus without complication, without long-term current use of insulin  Comments:  Check labs as planned      Chronic pain syndrome  Comments:  REfill meds  Check BONNY    Chronic atrial fibrillation  Comments:  Follow up as planned           Jackson Parkinson MD  4/29/2017  12:51 PM

## 2017-04-29 NOTE — PATIENT INSTRUCTIONS
Diagnoses and all orders for this visit:    Hyperlipidemia, unspecified hyperlipidemia type  Comments:  Now stable at present time  Monitor diet  Follow up as planned  Orders:  -     CBC & Differential  -     Comprehensive Metabolic Panel  -     Thyroid Panel With TSH  -     Lipid Panel With LDL / HDL Ratio  -     Vitamin D 25 hydroxy    Essential hypertension  Comments:  Monitor BP closely  Avoid salt  Orders:  -     CBC & Differential  -     Comprehensive Metabolic Panel  -     Thyroid Panel With TSH  -     Lipid Panel With LDL / HDL Ratio  -     Vitamin D 25 hydroxy    Vitamin D deficiency  Comments:  OTC Vitqamin D 2000 units daily  Orders:  -     CBC & Differential  -     Comprehensive Metabolic Panel  -     Thyroid Panel With TSH  -     Lipid Panel With LDL / HDL Ratio  -     Vitamin D 25 hydroxy    Encounter for long-term (current) use of medications  -     CBC & Differential  -     Comprehensive Metabolic Panel  -     Thyroid Panel With TSH  -     Lipid Panel With LDL / HDL Ratio  -     Vitamin D 25 hydroxy    Bipolar affective disorder, current episode hypomanic  Comments:  Stable at present time    Gastroesophageal reflux disease with esophagitis  Comments:  Stable at present time    Controlled type 2 diabetes mellitus without complication, without long-term current use of insulin  Comments:  Check labs as planned      Chronic pain syndrome  Comments:  REfill meds  Check BONNY    Chronic atrial fibrillation  Comments:  Follow up as planned

## 2017-05-01 LAB
25(OH)D3+25(OH)D2 SERPL-MCNC: 26.6 NG/ML (ref 30–100)
ALBUMIN SERPL-MCNC: 4.3 G/DL (ref 3.5–5.5)
ALBUMIN/GLOB SERPL: 1.8 {RATIO} (ref 1.2–2.2)
ALP SERPL-CCNC: 91 IU/L (ref 39–117)
ALT SERPL-CCNC: 21 IU/L (ref 0–32)
AST SERPL-CCNC: 23 IU/L (ref 0–40)
BASOPHILS # BLD AUTO: 0 X10E3/UL (ref 0–0.2)
BASOPHILS NFR BLD AUTO: 1 %
BILIRUB SERPL-MCNC: 0.3 MG/DL (ref 0–1.2)
BUN SERPL-MCNC: 14 MG/DL (ref 6–24)
BUN/CREAT SERPL: 16 (ref 9–23)
CALCIUM SERPL-MCNC: 9.9 MG/DL (ref 8.7–10.2)
CHLORIDE SERPL-SCNC: 102 MMOL/L (ref 96–106)
CHOLEST SERPL-MCNC: 184 MG/DL (ref 100–199)
CO2 SERPL-SCNC: 22 MMOL/L (ref 18–29)
CREAT SERPL-MCNC: 0.88 MG/DL (ref 0.57–1)
EOSINOPHIL # BLD AUTO: 0 X10E3/UL (ref 0–0.4)
EOSINOPHIL NFR BLD AUTO: 1 %
ERYTHROCYTE [DISTWIDTH] IN BLOOD BY AUTOMATED COUNT: 13.5 % (ref 12.3–15.4)
FT4I SERPL CALC-MCNC: 1.5 (ref 1.2–4.9)
GLOBULIN SER CALC-MCNC: 2.4 G/DL (ref 1.5–4.5)
GLUCOSE SERPL-MCNC: 90 MG/DL (ref 65–99)
HCT VFR BLD AUTO: 41.6 % (ref 34–46.6)
HDLC SERPL-MCNC: 60 MG/DL
HGB BLD-MCNC: 14.1 G/DL (ref 11.1–15.9)
IMM GRANULOCYTES # BLD: 0 X10E3/UL (ref 0–0.1)
IMM GRANULOCYTES NFR BLD: 0 %
LDLC SERPL CALC-MCNC: 84 MG/DL (ref 0–99)
LDLC/HDLC SERPL: 1.4 RATIO UNITS (ref 0–3.2)
LYMPHOCYTES # BLD AUTO: 1.9 X10E3/UL (ref 0.7–3.1)
LYMPHOCYTES NFR BLD AUTO: 34 %
MCH RBC QN AUTO: 33.7 PG (ref 26.6–33)
MCHC RBC AUTO-ENTMCNC: 33.9 G/DL (ref 31.5–35.7)
MCV RBC AUTO: 99 FL (ref 79–97)
MONOCYTES # BLD AUTO: 0.6 X10E3/UL (ref 0.1–0.9)
MONOCYTES NFR BLD AUTO: 11 %
NEUTROPHILS # BLD AUTO: 3.1 X10E3/UL (ref 1.4–7)
NEUTROPHILS NFR BLD AUTO: 53 %
PLATELET # BLD AUTO: 320 X10E3/UL (ref 150–379)
POTASSIUM SERPL-SCNC: 4.7 MMOL/L (ref 3.5–5.2)
PROT SERPL-MCNC: 6.7 G/DL (ref 6–8.5)
RBC # BLD AUTO: 4.19 X10E6/UL (ref 3.77–5.28)
SODIUM SERPL-SCNC: 142 MMOL/L (ref 134–144)
T3RU NFR SERPL: 27 % (ref 24–39)
T4 SERPL-MCNC: 5.5 UG/DL (ref 4.5–12)
TRIGL SERPL-MCNC: 202 MG/DL (ref 0–149)
TSH SERPL DL<=0.005 MIU/L-ACNC: 4.06 UIU/ML (ref 0.45–4.5)
VLDLC SERPL CALC-MCNC: 40 MG/DL (ref 5–40)
WBC # BLD AUTO: 5.7 X10E3/UL (ref 3.4–10.8)

## 2017-05-15 ENCOUNTER — LAB (OUTPATIENT)
Dept: LAB | Facility: HOSPITAL | Age: 62
End: 2017-05-15

## 2017-05-15 DIAGNOSIS — D50.8 IRON DEFICIENCY ANEMIA SECONDARY TO INADEQUATE DIETARY IRON INTAKE: ICD-10-CM

## 2017-05-15 LAB
BASOPHILS # BLD AUTO: 0.04 10*3/MM3 (ref 0–0.1)
BASOPHILS NFR BLD AUTO: 0.9 % (ref 0–1.1)
DEPRECATED RDW RBC AUTO: 43.7 FL (ref 37–49)
EOSINOPHIL # BLD AUTO: 0.19 10*3/MM3 (ref 0–0.36)
EOSINOPHIL NFR BLD AUTO: 4.5 % (ref 1–5)
ERYTHROCYTE [DISTWIDTH] IN BLOOD BY AUTOMATED COUNT: 11.6 % (ref 11.7–14.5)
FERRITIN SERPL-MCNC: 153.3 NG/ML (ref 11–207)
HCT VFR BLD AUTO: 39.9 % (ref 34–45)
HGB BLD-MCNC: 13.3 G/DL (ref 11.5–14.9)
HGB RETIC QN: 38.2 PG (ref 29.8–36.1)
IMM GRANULOCYTES # BLD: 0.01 10*3/MM3 (ref 0–0.03)
IMM GRANULOCYTES NFR BLD: 0.2 % (ref 0–0.5)
IMM RETICS NFR: 9.1 % (ref 3–15.8)
IRON 24H UR-MRATE: 81 MCG/DL (ref 37–145)
IRON SATN MFR SERPL: 24 % (ref 14–48)
LYMPHOCYTES # BLD AUTO: 1.36 10*3/MM3 (ref 1–3.5)
LYMPHOCYTES NFR BLD AUTO: 32.2 % (ref 20–49)
MCH RBC QN AUTO: 34.3 PG (ref 27–33)
MCHC RBC AUTO-ENTMCNC: 33.3 G/DL (ref 32–35)
MCV RBC AUTO: 102.8 FL (ref 83–97)
MONOCYTES # BLD AUTO: 0.57 10*3/MM3 (ref 0.25–0.8)
MONOCYTES NFR BLD AUTO: 13.5 % (ref 4–12)
NEUTROPHILS # BLD AUTO: 2.05 10*3/MM3 (ref 1.5–7)
NEUTROPHILS NFR BLD AUTO: 48.7 % (ref 39–75)
NRBC BLD MANUAL-RTO: 0 /100 WBC (ref 0–0)
PLATELET # BLD AUTO: 165 10*3/MM3 (ref 150–375)
PMV BLD AUTO: 9.1 FL (ref 8.9–12.1)
RBC # BLD AUTO: 3.88 10*6/MM3 (ref 3.9–5)
RETICS/RBC NFR AUTO: 1.59 % (ref 0.6–2)
TIBC SERPL-MCNC: 343 MCG/DL (ref 249–505)
TRANSFERRIN SERPL-MCNC: 245 MG/DL (ref 200–360)
WBC NRBC COR # BLD: 4.22 10*3/MM3 (ref 4–10)

## 2017-05-15 PROCEDURE — 85025 COMPLETE CBC W/AUTO DIFF WBC: CPT

## 2017-05-15 PROCEDURE — 36415 COLL VENOUS BLD VENIPUNCTURE: CPT

## 2017-05-15 PROCEDURE — 85046 RETICYTE/HGB CONCENTRATE: CPT

## 2017-05-15 PROCEDURE — 84466 ASSAY OF TRANSFERRIN: CPT

## 2017-05-15 PROCEDURE — 82728 ASSAY OF FERRITIN: CPT

## 2017-05-15 PROCEDURE — 83540 ASSAY OF IRON: CPT

## 2017-05-23 ENCOUNTER — OFFICE VISIT (OUTPATIENT)
Dept: ONCOLOGY | Facility: CLINIC | Age: 62
End: 2017-05-23

## 2017-05-23 ENCOUNTER — APPOINTMENT (OUTPATIENT)
Dept: LAB | Facility: HOSPITAL | Age: 62
End: 2017-05-23

## 2017-05-23 VITALS
SYSTOLIC BLOOD PRESSURE: 150 MMHG | BODY MASS INDEX: 30.46 KG/M2 | DIASTOLIC BLOOD PRESSURE: 102 MMHG | TEMPERATURE: 97.6 F | HEART RATE: 66 BPM | HEIGHT: 64 IN | RESPIRATION RATE: 16 BRPM | OXYGEN SATURATION: 96 % | WEIGHT: 178.4 LBS

## 2017-05-23 DIAGNOSIS — D50.8 IRON DEFICIENCY ANEMIA SECONDARY TO INADEQUATE DIETARY IRON INTAKE: Primary | ICD-10-CM

## 2017-05-23 DIAGNOSIS — F31.11 BIPOLAR AFFECTIVE DISORDER, CURRENTLY MANIC, MILD (HCC): ICD-10-CM

## 2017-05-23 DIAGNOSIS — F43.9 SITUATIONAL STRESS: ICD-10-CM

## 2017-05-23 PROCEDURE — 99213 OFFICE O/P EST LOW 20 MIN: CPT | Performed by: INTERNAL MEDICINE

## 2017-05-23 PROCEDURE — G0463 HOSPITAL OUTPT CLINIC VISIT: HCPCS | Performed by: INTERNAL MEDICINE

## 2017-05-24 RX ORDER — ZOLPIDEM TARTRATE 10 MG/1
TABLET ORAL
Qty: 30 TABLET | Refills: 0 | OUTPATIENT
Start: 2017-05-24 | End: 2017-06-05 | Stop reason: SDUPTHER

## 2017-05-24 RX ORDER — FLUOXETINE HYDROCHLORIDE 20 MG/1
CAPSULE ORAL
Qty: 90 CAPSULE | Refills: 0 | Status: SHIPPED | OUTPATIENT
Start: 2017-05-24 | End: 2017-06-25 | Stop reason: SDUPTHER

## 2017-05-24 RX ORDER — CLONAZEPAM 1 MG/1
TABLET ORAL
Qty: 75 TABLET | Refills: 0 | OUTPATIENT
Start: 2017-05-24 | End: 2017-06-05 | Stop reason: SDUPTHER

## 2017-06-05 ENCOUNTER — OFFICE VISIT (OUTPATIENT)
Dept: FAMILY MEDICINE CLINIC | Facility: CLINIC | Age: 62
End: 2017-06-05

## 2017-06-05 VITALS
DIASTOLIC BLOOD PRESSURE: 90 MMHG | WEIGHT: 180 LBS | SYSTOLIC BLOOD PRESSURE: 130 MMHG | TEMPERATURE: 97.6 F | HEIGHT: 64 IN | BODY MASS INDEX: 30.73 KG/M2

## 2017-06-05 DIAGNOSIS — F43.9 SITUATIONAL STRESS: ICD-10-CM

## 2017-06-05 DIAGNOSIS — K44.9 HIATAL HERNIA: ICD-10-CM

## 2017-06-05 DIAGNOSIS — I48.91 ATRIAL FIBRILLATION, UNSPECIFIED TYPE (HCC): ICD-10-CM

## 2017-06-05 DIAGNOSIS — E55.9 VITAMIN D DEFICIENCY: ICD-10-CM

## 2017-06-05 DIAGNOSIS — I10 ESSENTIAL HYPERTENSION: ICD-10-CM

## 2017-06-05 DIAGNOSIS — G89.4 CHRONIC PAIN SYNDROME: ICD-10-CM

## 2017-06-05 DIAGNOSIS — F32.A FATIGUE DUE TO DEPRESSION: ICD-10-CM

## 2017-06-05 DIAGNOSIS — E11.9 CONTROLLED TYPE 2 DIABETES MELLITUS WITHOUT COMPLICATION, WITHOUT LONG-TERM CURRENT USE OF INSULIN (HCC): Primary | ICD-10-CM

## 2017-06-05 DIAGNOSIS — E78.01 FAMILIAL HYPERCHOLESTEROLEMIA: ICD-10-CM

## 2017-06-05 DIAGNOSIS — K57.10 DIVERTICULOSIS OF SMALL INTESTINE WITHOUT HEMORRHAGE: ICD-10-CM

## 2017-06-05 DIAGNOSIS — K21.00 GASTROESOPHAGEAL REFLUX DISEASE WITH ESOPHAGITIS: ICD-10-CM

## 2017-06-05 DIAGNOSIS — R53.83 FATIGUE DUE TO DEPRESSION: ICD-10-CM

## 2017-06-05 PROCEDURE — 99214 OFFICE O/P EST MOD 30 MIN: CPT | Performed by: INTERNAL MEDICINE

## 2017-06-05 RX ORDER — HYDROCODONE BITARTRATE AND ACETAMINOPHEN 10; 325 MG/1; MG/1
1 TABLET ORAL EVERY 4 HOURS PRN
Qty: 160 TABLET | Refills: 0 | Status: SHIPPED | OUTPATIENT
Start: 2017-06-05 | End: 2017-06-05 | Stop reason: SDUPTHER

## 2017-06-05 RX ORDER — HYDROCODONE BITARTRATE AND ACETAMINOPHEN 10; 325 MG/1; MG/1
1 TABLET ORAL EVERY 4 HOURS PRN
Qty: 160 TABLET | Refills: 0 | Status: ON HOLD | OUTPATIENT
Start: 2017-06-05 | End: 2018-09-27

## 2017-06-05 RX ORDER — CLONAZEPAM 1 MG/1
1 TABLET ORAL 3 TIMES DAILY PRN
Qty: 75 TABLET | Refills: 0 | Status: SHIPPED | OUTPATIENT
Start: 2017-06-05 | End: 2017-07-24 | Stop reason: SDUPTHER

## 2017-06-05 RX ORDER — ZOLPIDEM TARTRATE 10 MG/1
10 TABLET ORAL NIGHTLY PRN
Qty: 30 TABLET | Refills: 0 | Status: SHIPPED | OUTPATIENT
Start: 2017-06-05 | End: 2017-07-28 | Stop reason: SDUPTHER

## 2017-06-05 NOTE — PATIENT INSTRUCTIONS
Diagnoses and all orders for this visit:    Controlled type 2 diabetes mellitus without complication, without long-term current use of insulin  Comments:  Continue to monitor   Watch sweets in the diet    Atrial fibrillation, unspecified type  Comments:  Does well when she remembers her meds  Follow  up as planned    Essential hypertension  Comments:  Avoid salt  Monitor  Follow up as planned    Fatigue due to depression  Comments:  ongoing issues wsith fatigue and depression    Familial hypercholesterolemia  Comments:  Feels good at present  No other acute changes  Ambien helps her sleep on occassion    Vitamin D deficiency  Comments:  Continue replacement  Follow up as planned    Diverticulosis of small intestine without hemorrhage  Comments:  Return if symptoms change or worsen    Gastroesophageal reflux disease with esophagitis  Comments:  Follow up as planneed  No other acute changes at present time    Hiatal hernia  Comments:  Stable at present    Situational stress  Comments:  Klonipen prn  Monnitor closely    Orders:  -     clonazePAM (KlonoPIN) 1 MG tablet; Take 1 tablet by mouth 3 (Three) Times a Day As Needed for Anxiety.    Chronic pain syndrome  Comments:  refill pain med  BONNY reviewed  Orders:  -     Discontinue: HYDROcodone-acetaminophen (NORCO)  MG per tablet; Take 1 tablet by mouth Every 4 (Four) Hours As Needed for Moderate Pain (4-6).  -     HYDROcodone-acetaminophen (NORCO)  MG per tablet; Take 1 tablet by mouth Every 4 (Four) Hours As Needed for Moderate Pain (4-6).    Other orders  -     zolpidem (AMBIEN) 10 MG tablet; Take 1 tablet by mouth At Night As Needed for Sleep.

## 2017-06-05 NOTE — PROGRESS NOTES
Milady Wilson is a 62 y.o. female   Chief Complaint   Patient presents with   • Hyperlipidemia     follow up       BONNY Pizano: Per House Bill #1 Requirements and [place] Board of Medical Licensure Regulations for prescribing of Schedule II and Schedule III with Hydrocodone, and other controlled medications for which the Board requires [facility] reporting and regulation, the following drug treatment plan was developed and reviewed with the patient on the date of this encounter:              Controlled medication(s) taken: Norco and Klonipen and Ambien          Medical Indication (including pain relief and/or other physical and psychoosocial functional issue) for treatment: Pain and Anxiety and sleep.  No other acute changes          Further Diagnostic tests, consultations, or treatments needed: follow up as planned            Plans for review of plan, adjustment and waning dose and further [facility] evaluation include: quarterly                  Risk for medication abuse for this patient based on physician review is felt to be extremely low.    Subjective   History of Present Illness     Milady Wilson is a 62 y.o.female who presents with hyperlipidemia.  Follow up as planned.  Sees Dr. Keys every 6 months for iron infusions.  Had blood drawn here today.  Follow up as planned.  No other acute changes.  Feels good at present time.  Doing well at present time.       The following portions of the patient's history were reviewed and updated as appropriate: past medical history, surgeries, family history, allergies, current medications, past social history and problem list.    A comprehensive review of 14 systems was peformed  Review of Systems   Constitutional: Negative for chills, fatigue, fever and unexpected weight change.   HENT: Negative for ear pain, hearing loss, sinus pressure, sore throat and tinnitus.    Eyes: Negative for pain, discharge and redness.   Respiratory: Negative for cough, shortness of  "breath and wheezing.    Cardiovascular: Negative for chest pain, palpitations and leg swelling.   Gastrointestinal: Negative for abdominal pain, constipation, diarrhea and nausea.   Endocrine: Negative for cold intolerance and heat intolerance.   Genitourinary: Negative for difficulty urinating, flank pain and urgency.   Musculoskeletal: Negative for back pain, joint swelling and myalgias.   Skin: Negative for rash and wound.   Allergic/Immunologic: Negative for environmental allergies and food allergies.   Neurological: Negative for dizziness, seizures, numbness and headaches.   Hematological: Negative for adenopathy. Does not bruise/bleed easily.   Psychiatric/Behavioral: Negative for decreased concentration, dysphoric mood and sleep disturbance. The patient is not nervous/anxious.    All other systems reviewed and are negative.      I have reviewed the patient's medical history in detail and updated the computerized patient record.      Objective   Vitals:    06/05/17 1726   BP: 130/90   BP Location: Right arm   Patient Position: Sitting   Temp: 97.6 °F (36.4 °C)   TempSrc: Oral   Weight: 180 lb (81.6 kg)   Height: 63.78\" (162 cm)           Physical Exam   Constitutional: She appears well-developed and well-nourished.   HENT:   Head: Normocephalic and atraumatic.   Right Ear: External ear normal.   Left Ear: External ear normal.   Nose: Nose normal.   Mouth/Throat: Oropharynx is clear and moist.   Eyes: Conjunctivae and EOM are normal. Pupils are equal, round, and reactive to light.   Neck: Normal range of motion. Neck supple.   Cardiovascular: Normal rate, regular rhythm, normal heart sounds and intact distal pulses.    Pulmonary/Chest: Effort normal and breath sounds normal.   Abdominal: Soft. Bowel sounds are normal.   Musculoskeletal: Normal range of motion.   Neurological: She is alert. She has normal reflexes.   Skin: Skin is warm and dry.   Psychiatric: She has a normal mood and affect. Her behavior is " normal. Judgment and thought content normal.       Procedures                            Assessment/Plan     Diagnoses and all orders for this visit:    Controlled type 2 diabetes mellitus without complication, without long-term current use of insulin  Comments:  Continue to monitor   Watch sweets in the diet    Atrial fibrillation, unspecified type  Comments:  Does well when she remembers her meds  Follow  up as planned    Essential hypertension  Comments:  Avoid salt  Monitor  Follow up as planned    Fatigue due to depression  Comments:  ongoing issues wsith fatigue and depression    Familial hypercholesterolemia  Comments:  Feels good at present  No other acute changes  Ambien helps her sleep on occassion    Vitamin D deficiency  Comments:  Continue replacement  Follow up as planned    Diverticulosis of small intestine without hemorrhage  Comments:  Return if symptoms change or worsen    Gastroesophageal reflux disease with esophagitis  Comments:  Follow up as planneed  No other acute changes at present time    Hiatal hernia  Comments:  Stable at present    Situational stress  Comments:  Klonipen prn  Monnitor closely    Orders:  -     clonazePAM (KlonoPIN) 1 MG tablet; Take 1 tablet by mouth 3 (Three) Times a Day As Needed for Anxiety.    Chronic pain syndrome  Comments:  refill pain med  BONNY reviewed  Orders:  -     Discontinue: HYDROcodone-acetaminophen (NORCO)  MG per tablet; Take 1 tablet by mouth Every 4 (Four) Hours As Needed for Moderate Pain (4-6).  -     HYDROcodone-acetaminophen (NORCO)  MG per tablet; Take 1 tablet by mouth Every 4 (Four) Hours As Needed for Moderate Pain (4-6).    Other orders  -     zolpidem (AMBIEN) 10 MG tablet; Take 1 tablet by mouth At Night As Needed for Sleep.           Jackson Parkinson MD  6/5/2017  5:29 PM

## 2017-06-06 DIAGNOSIS — F43.9 SITUATIONAL STRESS: ICD-10-CM

## 2017-06-06 RX ORDER — CARBAMAZEPINE 200 MG/1
TABLET ORAL
Qty: 60 TABLET | Refills: 3 | Status: SHIPPED | OUTPATIENT
Start: 2017-06-06 | End: 2018-01-03

## 2017-06-15 RX ORDER — CLOPIDOGREL BISULFATE 75 MG/1
TABLET ORAL
Qty: 30 TABLET | Refills: 2 | Status: SHIPPED | OUTPATIENT
Start: 2017-06-15 | End: 2017-10-06 | Stop reason: SDUPTHER

## 2017-06-25 DIAGNOSIS — F31.11 BIPOLAR AFFECTIVE DISORDER, CURRENTLY MANIC, MILD (HCC): ICD-10-CM

## 2017-06-26 RX ORDER — FLUOXETINE HYDROCHLORIDE 20 MG/1
CAPSULE ORAL
Qty: 90 CAPSULE | Refills: 0 | Status: SHIPPED | OUTPATIENT
Start: 2017-06-26 | End: 2017-07-30 | Stop reason: SDUPTHER

## 2017-07-24 DIAGNOSIS — F43.9 SITUATIONAL STRESS: ICD-10-CM

## 2017-07-24 RX ORDER — CLONAZEPAM 1 MG/1
1 TABLET ORAL 3 TIMES DAILY PRN
Qty: 75 TABLET | Refills: 0 | OUTPATIENT
Start: 2017-07-24

## 2017-07-28 RX ORDER — ZOLPIDEM TARTRATE 10 MG/1
10 TABLET ORAL NIGHTLY PRN
Qty: 30 TABLET | Refills: 0 | OUTPATIENT
Start: 2017-07-28 | End: 2018-01-03

## 2017-07-30 DIAGNOSIS — F31.11 BIPOLAR AFFECTIVE DISORDER, CURRENTLY MANIC, MILD (HCC): ICD-10-CM

## 2017-07-31 RX ORDER — FLUOXETINE HYDROCHLORIDE 20 MG/1
CAPSULE ORAL
Qty: 90 CAPSULE | Refills: 0 | Status: ON HOLD | OUTPATIENT
Start: 2017-07-31 | End: 2018-04-29

## 2017-08-15 ENCOUNTER — TRANSCRIBE ORDERS (OUTPATIENT)
Dept: ADMINISTRATIVE | Facility: HOSPITAL | Age: 62
End: 2017-08-15

## 2017-08-15 DIAGNOSIS — R74.8 ELEVATED LIVER ENZYMES: ICD-10-CM

## 2017-08-15 DIAGNOSIS — M25.551 PAIN IN RIGHT HIP: Primary | ICD-10-CM

## 2017-08-18 ENCOUNTER — APPOINTMENT (OUTPATIENT)
Dept: CT IMAGING | Facility: HOSPITAL | Age: 62
End: 2017-08-18
Attending: INTERNAL MEDICINE

## 2017-08-21 ENCOUNTER — HOSPITAL ENCOUNTER (OUTPATIENT)
Dept: ULTRASOUND IMAGING | Facility: HOSPITAL | Age: 62
Discharge: HOME | End: 2017-08-21
Attending: INTERNAL MEDICINE | Admitting: INTERNAL MEDICINE

## 2017-08-21 DIAGNOSIS — R74.8 ELEVATED LIVER ENZYMES: ICD-10-CM

## 2017-08-21 PROCEDURE — 76705 ECHO EXAM OF ABDOMEN: CPT

## 2017-09-05 ENCOUNTER — OFFICE VISIT (OUTPATIENT)
Dept: ORTHOPEDIC SURGERY | Facility: CLINIC | Age: 62
End: 2017-09-05

## 2017-09-05 VITALS — HEIGHT: 63 IN | WEIGHT: 179 LBS | TEMPERATURE: 97.6 F | BODY MASS INDEX: 31.71 KG/M2

## 2017-09-05 DIAGNOSIS — M25.551 HIP PAIN, RIGHT: Primary | ICD-10-CM

## 2017-09-05 DIAGNOSIS — M70.61 TROCHANTERIC BURSITIS, RIGHT HIP: ICD-10-CM

## 2017-09-05 DIAGNOSIS — Z98.890 HISTORY OF HIP SURGERY: ICD-10-CM

## 2017-09-05 PROCEDURE — 99203 OFFICE O/P NEW LOW 30 MIN: CPT | Performed by: ORTHOPAEDIC SURGERY

## 2017-09-05 PROCEDURE — 73502 X-RAY EXAM HIP UNI 2-3 VIEWS: CPT | Performed by: ORTHOPAEDIC SURGERY

## 2017-09-05 PROCEDURE — 20610 DRAIN/INJ JOINT/BURSA W/O US: CPT | Performed by: ORTHOPAEDIC SURGERY

## 2017-09-05 RX ORDER — METHYLPREDNISOLONE ACETATE 80 MG/ML
80 INJECTION, SUSPENSION INTRA-ARTICULAR; INTRALESIONAL; INTRAMUSCULAR; SOFT TISSUE
Status: COMPLETED | OUTPATIENT
Start: 2017-09-05 | End: 2017-09-05

## 2017-09-05 RX ORDER — BUPIVACAINE HYDROCHLORIDE 5 MG/ML
2 INJECTION, SOLUTION EPIDURAL; INTRACAUDAL
Status: COMPLETED | OUTPATIENT
Start: 2017-09-05 | End: 2017-09-05

## 2017-09-05 RX ORDER — LIDOCAINE HYDROCHLORIDE 10 MG/ML
2 INJECTION, SOLUTION EPIDURAL; INFILTRATION; INTRACAUDAL; PERINEURAL
Status: COMPLETED | OUTPATIENT
Start: 2017-09-05 | End: 2017-09-05

## 2017-09-05 RX ADMIN — LIDOCAINE HYDROCHLORIDE 2 ML: 10 INJECTION, SOLUTION EPIDURAL; INFILTRATION; INTRACAUDAL; PERINEURAL at 15:56

## 2017-09-05 RX ADMIN — METHYLPREDNISOLONE ACETATE 80 MG: 80 INJECTION, SUSPENSION INTRA-ARTICULAR; INTRALESIONAL; INTRAMUSCULAR; SOFT TISSUE at 15:56

## 2017-09-05 RX ADMIN — BUPIVACAINE HYDROCHLORIDE 2 ML: 5 INJECTION, SOLUTION EPIDURAL; INTRACAUDAL at 15:56

## 2017-09-07 DIAGNOSIS — M25.551 BILATERAL HIP PAIN: ICD-10-CM

## 2017-09-07 DIAGNOSIS — F31.11 BIPOLAR AFFECTIVE DISORDER, CURRENTLY MANIC, MILD (HCC): ICD-10-CM

## 2017-09-07 DIAGNOSIS — M25.552 BILATERAL HIP PAIN: ICD-10-CM

## 2017-09-07 RX ORDER — MELOXICAM 7.5 MG/1
TABLET ORAL
Qty: 60 TABLET | Refills: 4 | OUTPATIENT
Start: 2017-09-07

## 2017-09-07 RX ORDER — FLUOXETINE HYDROCHLORIDE 20 MG/1
CAPSULE ORAL
Qty: 90 CAPSULE | Refills: 0 | OUTPATIENT
Start: 2017-09-07

## 2017-09-08 DIAGNOSIS — M25.552 BILATERAL HIP PAIN: ICD-10-CM

## 2017-09-08 DIAGNOSIS — M25.551 BILATERAL HIP PAIN: ICD-10-CM

## 2017-09-08 RX ORDER — MELOXICAM 7.5 MG/1
TABLET ORAL
Qty: 60 TABLET | Refills: 4 | OUTPATIENT
Start: 2017-09-08

## 2017-10-06 RX ORDER — CLOPIDOGREL BISULFATE 75 MG/1
75 TABLET ORAL DAILY
Qty: 30 TABLET | Refills: 2 | Status: SHIPPED | OUTPATIENT
Start: 2017-10-06

## 2018-01-03 ENCOUNTER — OFFICE VISIT (OUTPATIENT)
Dept: SURGERY | Facility: CLINIC | Age: 63
End: 2018-01-03

## 2018-01-03 VITALS
HEART RATE: 76 BPM | BODY MASS INDEX: 32.09 KG/M2 | SYSTOLIC BLOOD PRESSURE: 110 MMHG | DIASTOLIC BLOOD PRESSURE: 78 MMHG | OXYGEN SATURATION: 98 % | HEIGHT: 63 IN | WEIGHT: 181.1 LBS

## 2018-01-03 DIAGNOSIS — R13.19 ESOPHAGEAL DYSPHAGIA: Primary | ICD-10-CM

## 2018-01-03 PROCEDURE — 99204 OFFICE O/P NEW MOD 45 MIN: CPT | Performed by: SURGERY

## 2018-01-03 NOTE — PROGRESS NOTES
Chief Complaint   Patient presents with   • Difficulty Swallowing     can't keep food down        Patient is a 62 y.o. female referred by Jackson Parkinson MD for Evaluation of dysphagia.  Patient reports she has had this problem in the past where she has difficulties swallowing and has had an EGD with dilation about 2 years ago which did alleviate her symptoms.  Patient is not currently on any PPI or H2 blockers.  Patient does have reflux and hoarseness constantly.  Patient reports solid foods are worse than liquids.  Patient's had a previous history of peptic ulcer disease.  Patient denies hemoptysis, hematemesis or hematochezia.  Patient denies any melanotic stool.  Patient denies any fevers, chills, nausea or unexplained weight loss.  Patient does vomit undigested food.    Past Medical History:   Diagnosis Date   • Anemia    • Anxiety    • Arthritis    • Atrial fibrillation    • Bipolar disorder    • Bleeding disorder    • Bleeding nose    • Cataract    • Chronic fatigue    • Coronary artery disease    • Diabetes mellitus     treatment no longer needed for DM since weight loss surgery, diet controlled   • Diverticulitis    • Gallbladder disease    • Gastric ulcer    • GERD (gastroesophageal reflux disease)    • H/O seasonal allergies    • H/O transfusion of packed red blood cells    • Hiatal hernia    • History of CT scan of abdomen 05/18/2009    NO SIGNIFICAN FINDINGS, POST CHOLECYSTECTOMY, PREVIOUS GASTRIC BYPASS AND HERNIA REPAIR   • Hyperlipidemia    • Hypertension    • Myocardial infarction    • Peptic ulceration 2013   • Pleuritic chest pain    • Sinusitis    • Sleep apnea    • Stroke    • TIA (transient ischemic attack)    • Vitamin D deficiency      Past Surgical History:   Procedure Laterality Date   • BILATERAL BREAST REDUCTION  1976   • CATARACT EXTRACTION  2010   • CHOLECYSTECTOMY  2005   • COLONOSCOPY  07/24/2009    TICS, NBIH   • ENDOSCOPY  07/24/2009    GASTRIC BYPASS W/ MEDIUM POUCH AND INTACT  STAPLE LINE, GASTRIC ULCER W/ CLEAN BASE   • ENDOSCOPY N/A 4/8/2016    - Ulceration at the anastomosis of the gastric remnant to the jejunum.FRAGMENTS OF GASTRIC AND SMALL INTESTINAL MUCOSA WITH MILD ACUTE AND CHRONIC   • GASTRIC BYPASS  2002 03/01/2014   • HERNIA REPAIR  2008; 2014    Dr. Madera   • HIP FRACTURE SURGERY  10/01/2009   • JOINT REPLACEMENT Right 06/02/2011    knee   • JOINT REPLACEMENT      2009/2010   • TONSILLECTOMY  1985   • UPPER GASTROINTESTINAL ENDOSCOPY  05/06/2015    PERFORMED BY DR. ROBYN GUTIERREZ   • VASCULAR SURGERY       Family History   Problem Relation Age of Onset   • Colon cancer Father 69   • Other Father      POLYP   • Diabetes Father    • Other Sister 52     POLYP   • Colon cancer Paternal Grandfather    • Cancer Paternal Grandfather    • Lung cancer Maternal Grandfather    • Prostate cancer Maternal Grandfather    • Cancer Maternal Grandfather    • Cancer Maternal Uncle      Social History   Substance Use Topics   • Smoking status: Never Smoker   • Smokeless tobacco: Never Used   • Alcohol use Yes      Comment: social         Current Outpatient Prescriptions:   •  B Complex-C (SUPER B COMPLEX PO), Take  by mouth Daily., Disp: , Rfl:   •  cholecalciferol (VITAMIN D3) 400 UNITS tablet, Take 400 Units by mouth Daily., Disp: , Rfl:   •  clonazePAM (KlonoPIN) 1 MG tablet, Take 1 tablet by mouth 3 (Three) Times a Day As Needed for Anxiety., Disp: 75 tablet, Rfl: 0  •  clopidogrel (PLAVIX) 75 MG tablet, Take 1 tablet by mouth Daily., Disp: 30 tablet, Rfl: 2  •  docusate sodium (COLACE) 100 MG capsule, Take 100 mg by mouth 3 (three) times a day as needed for constipation., Disp: , Rfl:   •  FLUoxetine (PROzac) 20 MG capsule, TAKE THREE CAPSULES BY MOUTH DAILY, Disp: 90 capsule, Rfl: 0  •  HYDROcodone-acetaminophen (NORCO)  MG per tablet, Take 1 tablet by mouth Every 4 (Four) Hours As Needed for Moderate Pain (4-6)., Disp: 160 tablet, Rfl: 0  •  meloxicam (MOBIC) 7.5 MG tablet, Take 1  "tablet by mouth daily., Disp: 60 tablet, Rfl: 5  •  metoprolol tartrate (LOPRESSOR) 25 MG tablet, TAKE ONE TABLET BY MOUTH TWICE A DAY, Disp: 180 tablet, Rfl: 0  •  naproxen sodium (ALEVE) 220 MG tablet, Take 220 mg by mouth 2 (Two) Times a Day As Needed for mild pain (1-3)., Disp: , Rfl:   •  promethazine (PHENERGAN) 25 MG tablet, Take  by mouth., Disp: , Rfl:   •  sore muscle (ICY HOT EXTRA STRENGTH) 10-30 % cream cream, Apply  topically As Needed., Disp: , Rfl:     Review of Systems   Constitutional: Positive for appetite change.   HENT: Positive for congestion.    Eyes: Positive for visual disturbance.   Respiratory: Positive for apnea, cough and shortness of breath.    Cardiovascular: Positive for leg swelling.   Genitourinary: Positive for frequency.   Musculoskeletal: Positive for arthralgias, back pain, gait problem and joint swelling.   Neurological: Positive for dizziness, weakness, light-headedness and headaches.   Hematological: Bruises/bleeds easily.   Psychiatric/Behavioral: Positive for decreased concentration, hallucinations and sleep disturbance. The patient is nervous/anxious.    All other systems reviewed and are negative.    All other systems have been reviewed and are negative.  Vitals:    01/03/18 1340   BP: 110/78   Pulse: 76   SpO2: 98%   Weight: 82.1 kg (181 lb 1.6 oz)   Height: 160 cm (63\")       Physical Exam  General/physcological:   Alert and oriented x3 in no acute distress  HEENT: Normal cephalic, atraumatic, PERRLA, EOMI, sclera anicteric, moist mucous membranes, neck is supple, no JVD, no carotid bruits, no thyromegaly no adenopathy  Respiratory: CTA and percussion  CVA: RRR, normal S1-S2, no murmurs, no gallops or rubs  GI: Positive BS, soft, nondistended, nontender, no rebound, no guarding, no hernias, no organomegaly and no masses  Musculoskeletal: Full range of motion, no clubbing, no cyanosis or edema  Neurovascular: Grossly intact    Patient does not use tobacco products " currently and I have counseled the patient to not start using tobacco products in the future.    Assessment:  Dysphagia  Plan:  I have recommended that the patient undergo further evaluation with an EGD and possible balloon dilation.  I have discussed this procedure in detail with the patient.  I have discussed the risks, benefits and alternatives.  I have discussed the risk of anesthesia, bleeding and perforation.  Patient understands these risks, benefits and alternatives and wishes to proceed.  I have her scheduled at her earliest convenience.    Faby Madera MD  General, Minimally Invasive and Endoscopic Surgery  [facility]    [address], [address]  [place], [place], [address]  P: [phone]  F: [phone]    Cc:  Jackson Parkinson MD

## 2018-02-23 ENCOUNTER — HOSPITAL ENCOUNTER (OUTPATIENT)
Facility: HOSPITAL | Age: 63
Setting detail: HOSPITAL OUTPATIENT SURGERY
Discharge: HOME | End: 2018-02-23
Attending: SURGERY | Admitting: SURGERY

## 2018-02-23 PROCEDURE — S0260 H&P FOR SURGERY: HCPCS | Performed by: SURGERY

## 2018-03-01 NOTE — H&P
Difficulty Swallowing       can't keep food down         Patient is a 62 y.o. female referred by Jackson Parkinson MD for Evaluation of dysphagia.  Patient reports she has had this problem in the past where she has difficulties swallowing and has had an EGD with dilation about 2 years ago which did alleviate her symptoms.  Patient is not currently on any PPI or H2 blockers.  Patient does have reflux and hoarseness constantly.  Patient reports solid foods are worse than liquids.  Patient's had a previous history of peptic ulcer disease.  Patient denies hemoptysis, hematemesis or hematochezia.  Patient denies any melanotic stool.  Patient denies any fevers, chills, nausea or unexplained weight loss.  Patient does vomit undigested food.      Medical History         Past Medical History:   Diagnosis Date   • Anemia     • Anxiety     • Arthritis     • Atrial fibrillation     • Bipolar disorder     • Bleeding disorder     • Bleeding nose     • Cataract     • Chronic fatigue     • Coronary artery disease     • Diabetes mellitus       treatment no longer needed for DM since weight loss surgery, diet controlled   • Diverticulitis     • Gallbladder disease     • Gastric ulcer     • GERD (gastroesophageal reflux disease)     • H/O seasonal allergies     • H/O transfusion of packed red blood cells     • Hiatal hernia     • History of CT scan of abdomen 05/18/2009     NO SIGNIFICAN FINDINGS, POST CHOLECYSTECTOMY, PREVIOUS GASTRIC BYPASS AND HERNIA REPAIR   • Hyperlipidemia     • Hypertension     • Myocardial infarction     • Peptic ulceration 2013   • Pleuritic chest pain     • Sinusitis     • Sleep apnea     • Stroke     • TIA (transient ischemic attack)     • Vitamin D deficiency            Surgical History          Past Surgical History:   Procedure Laterality Date   • BILATERAL BREAST REDUCTION   1976   • CATARACT EXTRACTION   2010   • CHOLECYSTECTOMY   2005   • COLONOSCOPY   07/24/2009     TICS, NBIH   • ENDOSCOPY    07/24/2009     GASTRIC BYPASS W/ MEDIUM POUCH AND INTACT STAPLE LINE, GASTRIC ULCER W/ CLEAN BASE   • ENDOSCOPY N/A 4/8/2016     - Ulceration at the anastomosis of the gastric remnant to the jejunum.FRAGMENTS OF GASTRIC AND SMALL INTESTINAL MUCOSA WITH MILD ACUTE AND CHRONIC   • GASTRIC BYPASS   2002 03/01/2014   • HERNIA REPAIR   2008; 2014     Dr. Madera   • HIP FRACTURE SURGERY   10/01/2009   • JOINT REPLACEMENT Right 06/02/2011     knee   • JOINT REPLACEMENT         2009/2010   • TONSILLECTOMY   1985   • UPPER GASTROINTESTINAL ENDOSCOPY   05/06/2015     PERFORMED BY DR. ROBYN GUTIERREZ   • VASCULAR SURGERY             Family History   Problem Relation Age of Onset   • Colon cancer Father 69   • Other Father         POLYP   • Diabetes Father     • Other Sister 52       POLYP   • Colon cancer Paternal Grandfather     • Cancer Paternal Grandfather     • Lung cancer Maternal Grandfather     • Prostate cancer Maternal Grandfather     • Cancer Maternal Grandfather     • Cancer Maternal Uncle                Social History    Substance Use Topics    • Smoking status: Never Smoker    • Smokeless tobacco: Never Used    • Alcohol use Yes         Comment: social             Current Outpatient Prescriptions:   •  B Complex-C (SUPER B COMPLEX PO), Take  by mouth Daily., Disp: , Rfl:   •  cholecalciferol (VITAMIN D3) 400 UNITS tablet, Take 400 Units by mouth Daily., Disp: , Rfl:   •  clonazePAM (KlonoPIN) 1 MG tablet, Take 1 tablet by mouth 3 (Three) Times a Day As Needed for Anxiety., Disp: 75 tablet, Rfl: 0  •  clopidogrel (PLAVIX) 75 MG tablet, Take 1 tablet by mouth Daily., Disp: 30 tablet, Rfl: 2  •  docusate sodium (COLACE) 100 MG capsule, Take 100 mg by mouth 3 (three) times a day as needed for constipation., Disp: , Rfl:   •  FLUoxetine (PROzac) 20 MG capsule, TAKE THREE CAPSULES BY MOUTH DAILY, Disp: 90 capsule, Rfl: 0  •  HYDROcodone-acetaminophen (NORCO)  MG per tablet, Take 1 tablet by mouth Every 4 (Four)  Hours As Needed for Moderate Pain (4-6)., Disp: 160 tablet, Rfl: 0  •  meloxicam (MOBIC) 7.5 MG tablet, Take 1 tablet by mouth daily., Disp: 60 tablet, Rfl: 5  •  metoprolol tartrate (LOPRESSOR) 25 MG tablet, TAKE ONE TABLET BY MOUTH TWICE A DAY, Disp: 180 tablet, Rfl: 0  •  naproxen sodium (ALEVE) 220 MG tablet, Take 220 mg by mouth 2 (Two) Times a Day As Needed for mild pain (1-3)., Disp: , Rfl:   •  promethazine (PHENERGAN) 25 MG tablet, Take  by mouth., Disp: , Rfl:   •  sore muscle (ICY HOT EXTRA STRENGTH) 10-30 % cream cream, Apply  topically As Needed., Disp: , Rfl:      Review of Systems   Constitutional: Positive for appetite change.   HENT: Positive for congestion.    Eyes: Positive for visual disturbance.   Respiratory: Positive for apnea, cough and shortness of breath.    Cardiovascular: Positive for leg swelling.   Genitourinary: Positive for frequency.   Musculoskeletal: Positive for arthralgias, back pain, gait problem and joint swelling.   Neurological: Positive for dizziness, weakness, light-headedness and headaches.   Hematological: Bruises/bleeds easily.   Psychiatric/Behavioral: Positive for decreased concentration, hallucinations and sleep disturbance. The patient is nervous/anxious.    All other systems reviewed and are negative.      Physical Exam  General/physcological:   Alert and oriented x3 in no acute distress  HEENT: Normal cephalic, atraumatic, PERRLA, EOMI, sclera anicteric, moist mucous membranes, neck is supple, no JVD, no carotid bruits, no thyromegaly no adenopathy  Respiratory: CTA and percussion  CVA: RRR, normal S1-S2, no murmurs, no gallops or rubs  GI: Positive BS, soft, nondistended, nontender, no rebound, no guarding, no hernias, no organomegaly and no masses  Musculoskeletal: Full range of motion, no clubbing, no cyanosis or edema  Neurovascular: Grossly intact     Patient does not use tobacco products currently and I have counseled the patient to not start using tobacco  products in the future.     Assessment:  Dysphagia  Plan:  I have recommended that the patient undergo further evaluation with an EGD and possible balloon dilation.  I have discussed this procedure in detail with the patient.  I have discussed the risks, benefits and alternatives.  I have discussed the risk of anesthesia, bleeding and perforation.  Patient understands these risks, benefits and alternatives and wishes to proceed.  I have her scheduled at her earliest convenience.     Faby Madera MD  General, Minimally Invasive and Endoscopic Surgery  [facility]     [address], [address]  [place], [place], [address]  P: [phone]  F: [phone]

## 2018-04-27 ENCOUNTER — APPOINTMENT (OUTPATIENT)
Dept: GENERAL RADIOLOGY | Facility: HOSPITAL | Age: 63
End: 2018-04-27
Attending: INTERNAL MEDICINE

## 2018-04-27 ENCOUNTER — HOSPITAL ENCOUNTER (OUTPATIENT)
Facility: HOSPITAL | Age: 63
Discharge: HOME | End: 2018-04-29
Attending: INTERNAL MEDICINE | Admitting: INTERNAL MEDICINE

## 2018-04-27 ENCOUNTER — APPOINTMENT (OUTPATIENT)
Dept: ULTRASOUND IMAGING | Facility: HOSPITAL | Age: 63
End: 2018-04-27

## 2018-04-27 DIAGNOSIS — E11.9 CONTROLLED TYPE 2 DIABETES MELLITUS WITHOUT COMPLICATION, WITHOUT LONG-TERM CURRENT USE OF INSULIN (HCC): ICD-10-CM

## 2018-04-27 DIAGNOSIS — D50.8 IRON DEFICIENCY ANEMIA SECONDARY TO INADEQUATE DIETARY IRON INTAKE: ICD-10-CM

## 2018-04-27 DIAGNOSIS — M15.9 PRIMARY OSTEOARTHRITIS INVOLVING MULTIPLE JOINTS: ICD-10-CM

## 2018-04-27 DIAGNOSIS — K44.9 HIATAL HERNIA: ICD-10-CM

## 2018-04-27 DIAGNOSIS — N95.9 MENOPAUSAL AND POSTMENOPAUSAL DISORDER: ICD-10-CM

## 2018-04-27 DIAGNOSIS — R13.19 ESOPHAGEAL DYSPHAGIA: ICD-10-CM

## 2018-04-27 DIAGNOSIS — G47.33 OSA (OBSTRUCTIVE SLEEP APNEA): ICD-10-CM

## 2018-04-27 DIAGNOSIS — Z74.09 IMPAIRED FUNCTIONAL MOBILITY, BALANCE, GAIT, AND ENDURANCE: Primary | ICD-10-CM

## 2018-04-27 DIAGNOSIS — F43.9 SITUATIONAL STRESS: ICD-10-CM

## 2018-04-27 DIAGNOSIS — R29.6 RECURRENT FALLS WHILE WALKING: ICD-10-CM

## 2018-04-27 DIAGNOSIS — N17.9 ACUTE KIDNEY INJURY (HCC): ICD-10-CM

## 2018-04-27 DIAGNOSIS — M70.61 TROCHANTERIC BURSITIS, RIGHT HIP: ICD-10-CM

## 2018-04-27 DIAGNOSIS — I10 ESSENTIAL HYPERTENSION: ICD-10-CM

## 2018-04-27 DIAGNOSIS — K92.1 MELENA: ICD-10-CM

## 2018-04-27 DIAGNOSIS — K21.00 GASTROESOPHAGEAL REFLUX DISEASE WITH ESOPHAGITIS: ICD-10-CM

## 2018-04-27 DIAGNOSIS — F32.A MODERATELY SEVERE DEPRESSION: ICD-10-CM

## 2018-04-27 DIAGNOSIS — D62 ACUTE POSTHEMORRHAGIC ANEMIA: ICD-10-CM

## 2018-04-27 DIAGNOSIS — R55 VASOVAGAL SYNCOPE: ICD-10-CM

## 2018-04-27 DIAGNOSIS — F31.11 BIPOLAR AFFECTIVE DISORDER, CURRENTLY MANIC, MILD (HCC): ICD-10-CM

## 2018-04-27 DIAGNOSIS — F06.4 ORGANIC ANXIETY SYNDROME: ICD-10-CM

## 2018-04-27 DIAGNOSIS — E55.9 VITAMIN D DEFICIENCY: ICD-10-CM

## 2018-04-27 DIAGNOSIS — F31.4 BIPOLAR DISORDER, CURRENT EPISODE DEPRESSED, SEVERE, WITHOUT PSYCHOTIC FEATURES (HCC): ICD-10-CM

## 2018-04-27 DIAGNOSIS — E66.9 OBESITY (BMI 30.0-34.9): ICD-10-CM

## 2018-04-27 DIAGNOSIS — E78.2 MIXED HYPERLIPIDEMIA: ICD-10-CM

## 2018-04-27 DIAGNOSIS — K29.00 ACUTE SUPERFICIAL GASTRITIS WITHOUT HEMORRHAGE: ICD-10-CM

## 2018-04-27 DIAGNOSIS — Z86.73 H/O TIA (TRANSIENT ISCHEMIC ATTACK) AND STROKE: ICD-10-CM

## 2018-04-27 PROBLEM — N39.0 URINARY TRACT INFECTION WITHOUT HEMATURIA: Status: ACTIVE | Noted: 2018-04-27

## 2018-04-27 PROBLEM — R82.90 ABNORMAL URINE: Status: ACTIVE | Noted: 2017-04-20

## 2018-04-27 PROBLEM — S99.921S FOOT TRAUMA, RIGHT, SEQUELA: Status: ACTIVE | Noted: 2018-04-27

## 2018-04-27 LAB
ALBUMIN SERPL-MCNC: 3.9 G/DL (ref 3.5–5.2)
ALBUMIN/GLOB SERPL: 1.4 G/DL
ALP SERPL-CCNC: 97 U/L (ref 39–117)
ALT SERPL W P-5'-P-CCNC: 29 U/L (ref 1–33)
AMPHET+METHAMPHET UR QL: POSITIVE
AMYLASE SERPL-CCNC: 26 U/L (ref 28–100)
ANION GAP SERPL CALCULATED.3IONS-SCNC: 9.6 MMOL/L
APAP SERPL-MCNC: 8 MCG/ML (ref 10–30)
AST SERPL-CCNC: 50 U/L (ref 1–32)
BACTERIA UR QL AUTO: ABNORMAL /HPF
BARBITURATES UR QL SCN: NEGATIVE
BASOPHILS # BLD AUTO: 0.02 10*3/MM3 (ref 0–0.2)
BASOPHILS NFR BLD AUTO: 0.4 % (ref 0–1.5)
BENZODIAZ UR QL SCN: POSITIVE
BILIRUB SERPL-MCNC: 0.3 MG/DL (ref 0.1–1.2)
BILIRUB UR QL STRIP: NEGATIVE
BUN BLD-MCNC: 16 MG/DL (ref 8–23)
BUN/CREAT SERPL: 21.6 (ref 7–25)
CALCIUM SPEC-SCNC: 9.7 MG/DL (ref 8.6–10.5)
CANNABINOIDS SERPL QL: NEGATIVE
CHLORIDE SERPL-SCNC: 106 MMOL/L (ref 98–107)
CHOLEST SERPL-MCNC: 169 MG/DL (ref 0–200)
CLARITY UR: CLEAR
CO2 SERPL-SCNC: 27.4 MMOL/L (ref 22–29)
COCAINE UR QL: NEGATIVE
COLOR UR: YELLOW
CREAT BLD-MCNC: 0.74 MG/DL (ref 0.57–1)
DEPRECATED RDW RBC AUTO: 44.8 FL (ref 37–54)
EOSINOPHIL # BLD AUTO: 0.19 10*3/MM3 (ref 0–0.7)
EOSINOPHIL NFR BLD AUTO: 4.2 % (ref 0.3–6.2)
ERYTHROCYTE [DISTWIDTH] IN BLOOD BY AUTOMATED COUNT: 12.2 % (ref 11.7–13)
GFR SERPL CREATININE-BSD FRML MDRD: 80 ML/MIN/1.73
GGT SERPL-CCNC: 214 U/L (ref 5–36)
GLOBULIN UR ELPH-MCNC: 2.7 GM/DL
GLUCOSE BLD-MCNC: 108 MG/DL (ref 65–99)
GLUCOSE UR STRIP-MCNC: NEGATIVE MG/DL
HCT VFR BLD AUTO: 42.5 % (ref 35.6–45.5)
HDLC SERPL-MCNC: 55 MG/DL (ref 40–60)
HGB BLD-MCNC: 13.7 G/DL (ref 11.9–15.5)
HGB UR QL STRIP.AUTO: NEGATIVE
HYALINE CASTS UR QL AUTO: ABNORMAL /LPF
IMM GRANULOCYTES # BLD: 0 10*3/MM3 (ref 0–0.03)
IMM GRANULOCYTES NFR BLD: 0 % (ref 0–0.5)
KETONES UR QL STRIP: NEGATIVE
LDLC SERPL CALC-MCNC: 79 MG/DL (ref 0–100)
LDLC/HDLC SERPL: 1.44 {RATIO}
LEUKOCYTE ESTERASE UR QL STRIP.AUTO: ABNORMAL
LIPASE SERPL-CCNC: 9 U/L (ref 13–60)
LYMPHOCYTES # BLD AUTO: 2.09 10*3/MM3 (ref 0.9–4.8)
LYMPHOCYTES NFR BLD AUTO: 46.5 % (ref 19.6–45.3)
MAGNESIUM SERPL-MCNC: 1.8 MG/DL (ref 1.6–2.4)
MCH RBC QN AUTO: 32.9 PG (ref 26.9–32)
MCHC RBC AUTO-ENTMCNC: 32.2 G/DL (ref 32.4–36.3)
MCV RBC AUTO: 101.9 FL (ref 80.5–98.2)
METHADONE UR QL SCN: NEGATIVE
MONOCYTES # BLD AUTO: 0.45 10*3/MM3 (ref 0.2–1.2)
MONOCYTES NFR BLD AUTO: 10 % (ref 5–12)
NEUTROPHILS # BLD AUTO: 1.74 10*3/MM3 (ref 1.9–8.1)
NEUTROPHILS NFR BLD AUTO: 38.9 % (ref 42.7–76)
NITRITE UR QL STRIP: NEGATIVE
OPIATES UR QL: POSITIVE
OXYCODONE UR QL SCN: NEGATIVE
PH UR STRIP.AUTO: 5.5 [PH] (ref 5–8)
PHOSPHATE SERPL-MCNC: 3.1 MG/DL (ref 2.5–4.5)
PLATELET # BLD AUTO: 182 10*3/MM3 (ref 140–500)
PMV BLD AUTO: 10.2 FL (ref 6–12)
POTASSIUM BLD-SCNC: 4.3 MMOL/L (ref 3.5–5.2)
PROT SERPL-MCNC: 6.6 G/DL (ref 6–8.5)
PROT UR QL STRIP: NEGATIVE
RBC # BLD AUTO: 4.17 10*6/MM3 (ref 3.9–5.2)
RBC # UR: ABNORMAL /HPF
REF LAB TEST METHOD: ABNORMAL
SALICYLATES SERPL-MCNC: <0.3 MG/DL
SODIUM BLD-SCNC: 143 MMOL/L (ref 136–145)
SP GR UR STRIP: 1.02 (ref 1–1.03)
SQUAMOUS #/AREA URNS HPF: ABNORMAL /HPF
TRIGL SERPL-MCNC: 173 MG/DL (ref 0–150)
TSH SERPL DL<=0.05 MIU/L-ACNC: 3.66 MIU/ML (ref 0.27–4.2)
UROBILINOGEN UR QL STRIP: ABNORMAL
VLDLC SERPL-MCNC: 34.6 MG/DL (ref 5–40)
WBC NRBC COR # BLD: 4.49 10*3/MM3 (ref 4.5–10.7)
WBC UR QL AUTO: ABNORMAL /HPF

## 2018-04-27 PROCEDURE — 80307 DRUG TEST PRSMV CHEM ANLYZR: CPT | Performed by: INTERNAL MEDICINE

## 2018-04-27 PROCEDURE — 84443 ASSAY THYROID STIM HORMONE: CPT | Performed by: INTERNAL MEDICINE

## 2018-04-27 PROCEDURE — G0378 HOSPITAL OBSERVATION PER HR: HCPCS

## 2018-04-27 PROCEDURE — 25010000002 ENOXAPARIN PER 10 MG: Performed by: INTERNAL MEDICINE

## 2018-04-27 PROCEDURE — 83690 ASSAY OF LIPASE: CPT | Performed by: INTERNAL MEDICINE

## 2018-04-27 PROCEDURE — 85027 COMPLETE CBC AUTOMATED: CPT | Performed by: INTERNAL MEDICINE

## 2018-04-27 PROCEDURE — 96372 THER/PROPH/DIAG INJ SC/IM: CPT

## 2018-04-27 PROCEDURE — 93005 ELECTROCARDIOGRAM TRACING: CPT | Performed by: INTERNAL MEDICINE

## 2018-04-27 PROCEDURE — 76775 US EXAM ABDO BACK WALL LIM: CPT

## 2018-04-27 PROCEDURE — 74022 RADEX COMPL AQT ABD SERIES: CPT

## 2018-04-27 PROCEDURE — 80053 COMPREHEN METABOLIC PANEL: CPT | Performed by: INTERNAL MEDICINE

## 2018-04-27 PROCEDURE — 81001 URINALYSIS AUTO W/SCOPE: CPT | Performed by: INTERNAL MEDICINE

## 2018-04-27 PROCEDURE — 80061 LIPID PANEL: CPT | Performed by: INTERNAL MEDICINE

## 2018-04-27 PROCEDURE — 83735 ASSAY OF MAGNESIUM: CPT | Performed by: INTERNAL MEDICINE

## 2018-04-27 PROCEDURE — 82977 ASSAY OF GGT: CPT | Performed by: INTERNAL MEDICINE

## 2018-04-27 PROCEDURE — 73630 X-RAY EXAM OF FOOT: CPT

## 2018-04-27 PROCEDURE — 93010 ELECTROCARDIOGRAM REPORT: CPT | Performed by: INTERNAL MEDICINE

## 2018-04-27 PROCEDURE — 96374 THER/PROPH/DIAG INJ IV PUSH: CPT

## 2018-04-27 PROCEDURE — 82150 ASSAY OF AMYLASE: CPT | Performed by: INTERNAL MEDICINE

## 2018-04-27 PROCEDURE — 25010000002 MORPHINE PER 10 MG: Performed by: INTERNAL MEDICINE

## 2018-04-27 PROCEDURE — 84100 ASSAY OF PHOSPHORUS: CPT | Performed by: INTERNAL MEDICINE

## 2018-04-27 RX ORDER — FLUOXETINE HYDROCHLORIDE 20 MG/1
60 CAPSULE ORAL DAILY
Status: DISCONTINUED | OUTPATIENT
Start: 2018-04-27 | End: 2018-04-28

## 2018-04-27 RX ORDER — SODIUM CHLORIDE 0.9 % (FLUSH) 0.9 %
1-10 SYRINGE (ML) INJECTION AS NEEDED
Status: DISCONTINUED | OUTPATIENT
Start: 2018-04-27 | End: 2018-04-29 | Stop reason: HOSPADM

## 2018-04-27 RX ORDER — DOCUSATE SODIUM 100 MG/1
100 CAPSULE, LIQUID FILLED ORAL DAILY
Status: DISCONTINUED | OUTPATIENT
Start: 2018-04-27 | End: 2018-04-29 | Stop reason: HOSPADM

## 2018-04-27 RX ORDER — CALCIUM CARBONATE 200(500)MG
2 TABLET,CHEWABLE ORAL 3 TIMES DAILY PRN
Status: DISCONTINUED | OUTPATIENT
Start: 2018-04-27 | End: 2018-04-29 | Stop reason: HOSPADM

## 2018-04-27 RX ORDER — MUSCLE RUB CREAM 100; 150 MG/G; MG/G
CREAM TOPICAL 4 TIMES DAILY PRN
Status: DISCONTINUED | OUTPATIENT
Start: 2018-04-27 | End: 2018-04-29 | Stop reason: HOSPADM

## 2018-04-27 RX ORDER — ACETAMINOPHEN 325 MG/1
650 TABLET ORAL EVERY 4 HOURS PRN
Status: DISCONTINUED | OUTPATIENT
Start: 2018-04-27 | End: 2018-04-29 | Stop reason: HOSPADM

## 2018-04-27 RX ORDER — MORPHINE SULFATE 2 MG/ML
1 INJECTION, SOLUTION INTRAMUSCULAR; INTRAVENOUS EVERY 4 HOURS PRN
Status: DISCONTINUED | OUTPATIENT
Start: 2018-04-27 | End: 2018-04-29 | Stop reason: HOSPADM

## 2018-04-27 RX ORDER — CLOPIDOGREL BISULFATE 75 MG/1
75 TABLET ORAL DAILY
Status: DISCONTINUED | OUTPATIENT
Start: 2018-04-27 | End: 2018-04-29 | Stop reason: HOSPADM

## 2018-04-27 RX ORDER — CLONAZEPAM 1 MG/1
1 TABLET ORAL 3 TIMES DAILY PRN
Status: DISCONTINUED | OUTPATIENT
Start: 2018-04-27 | End: 2018-04-29 | Stop reason: HOSPADM

## 2018-04-27 RX ORDER — ONDANSETRON 2 MG/ML
4 INJECTION INTRAMUSCULAR; INTRAVENOUS EVERY 6 HOURS PRN
Status: DISCONTINUED | OUTPATIENT
Start: 2018-04-27 | End: 2018-04-29 | Stop reason: HOSPADM

## 2018-04-27 RX ORDER — LORAZEPAM 2 MG/ML
0.5 INJECTION INTRAMUSCULAR EVERY 6 HOURS PRN
Status: DISCONTINUED | OUTPATIENT
Start: 2018-04-27 | End: 2018-04-29 | Stop reason: HOSPADM

## 2018-04-27 RX ORDER — PROMETHAZINE HYDROCHLORIDE 25 MG/1
25 TABLET ORAL EVERY 6 HOURS PRN
Status: DISCONTINUED | OUTPATIENT
Start: 2018-04-27 | End: 2018-04-29 | Stop reason: HOSPADM

## 2018-04-27 RX ORDER — DOCUSATE SODIUM 100 MG/1
100 CAPSULE, LIQUID FILLED ORAL 3 TIMES DAILY PRN
Status: DISCONTINUED | OUTPATIENT
Start: 2018-04-27 | End: 2018-04-29 | Stop reason: HOSPADM

## 2018-04-27 RX ORDER — MELOXICAM 7.5 MG/1
7.5 TABLET ORAL DAILY
Status: DISCONTINUED | OUTPATIENT
Start: 2018-04-27 | End: 2018-04-29 | Stop reason: HOSPADM

## 2018-04-27 RX ORDER — NALOXONE HCL 0.4 MG/ML
0.4 VIAL (ML) INJECTION
Status: DISCONTINUED | OUTPATIENT
Start: 2018-04-27 | End: 2018-04-29 | Stop reason: HOSPADM

## 2018-04-27 RX ORDER — HYDROCODONE BITARTRATE AND ACETAMINOPHEN 10; 325 MG/1; MG/1
1 TABLET ORAL EVERY 4 HOURS PRN
Status: DISCONTINUED | OUTPATIENT
Start: 2018-04-27 | End: 2018-04-29 | Stop reason: HOSPADM

## 2018-04-27 RX ORDER — OMEGA-3S/DHA/EPA/FISH OIL/D3 300MG-1000
400 CAPSULE ORAL DAILY
Status: DISCONTINUED | OUTPATIENT
Start: 2018-04-27 | End: 2018-04-29 | Stop reason: HOSPADM

## 2018-04-27 RX ORDER — CIPROFLOXACIN 250 MG/1
500 TABLET, FILM COATED ORAL EVERY 12 HOURS SCHEDULED
Status: DISCONTINUED | OUTPATIENT
Start: 2018-04-28 | End: 2018-04-29 | Stop reason: HOSPADM

## 2018-04-27 RX ADMIN — DOCUSATE SODIUM 100 MG: 100 CAPSULE, LIQUID FILLED ORAL at 22:08

## 2018-04-27 RX ADMIN — FLUOXETINE HYDROCHLORIDE 60 MG: 20 CAPSULE ORAL at 22:00

## 2018-04-27 RX ADMIN — METOPROLOL TARTRATE 25 MG: 25 TABLET ORAL at 22:08

## 2018-04-27 RX ADMIN — CLONAZEPAM 1 MG: 1 TABLET ORAL at 20:28

## 2018-04-27 RX ADMIN — HYDROCODONE BITARTRATE AND ACETAMINOPHEN 1 TABLET: 10; 325 TABLET ORAL at 20:01

## 2018-04-27 RX ADMIN — MELOXICAM 7.5 MG: 7.5 TABLET ORAL at 22:01

## 2018-04-27 RX ADMIN — MORPHINE SULFATE 1 MG: 2 INJECTION, SOLUTION INTRAMUSCULAR; INTRAVENOUS at 21:58

## 2018-04-27 RX ADMIN — CHOLECALCIFEROL TAB 10 MCG (400 UNIT) 400 UNITS: 10 TAB at 22:00

## 2018-04-27 RX ADMIN — CLOPIDOGREL 75 MG: 75 TABLET, FILM COATED ORAL at 22:01

## 2018-04-27 RX ADMIN — ENOXAPARIN SODIUM 40 MG: 40 INJECTION SUBCUTANEOUS at 20:20

## 2018-04-28 LAB
25(OH)D3 SERPL-MCNC: 29.3 NG/ML (ref 30–100)
ANION GAP SERPL CALCULATED.3IONS-SCNC: 10.2 MMOL/L
APTT PPP: 33.4 SECONDS (ref 22.7–35.4)
BASOPHILS # BLD AUTO: 0.01 10*3/MM3 (ref 0–0.2)
BASOPHILS NFR BLD AUTO: 0.2 % (ref 0–1.5)
BUN BLD-MCNC: 14 MG/DL (ref 8–23)
BUN/CREAT SERPL: 20 (ref 7–25)
CALCIUM SPEC-SCNC: 9.4 MG/DL (ref 8.6–10.5)
CHLORIDE SERPL-SCNC: 107 MMOL/L (ref 98–107)
CO2 SERPL-SCNC: 26.8 MMOL/L (ref 22–29)
CREAT BLD-MCNC: 0.7 MG/DL (ref 0.57–1)
DEPRECATED RDW RBC AUTO: 44 FL (ref 37–54)
EOSINOPHIL # BLD AUTO: 0.15 10*3/MM3 (ref 0–0.7)
EOSINOPHIL NFR BLD AUTO: 3.6 % (ref 0.3–6.2)
ERYTHROCYTE [DISTWIDTH] IN BLOOD BY AUTOMATED COUNT: 12.1 % (ref 11.7–13)
FOLATE SERPL-MCNC: >20 NG/ML (ref 4.78–24.2)
GFR SERPL CREATININE-BSD FRML MDRD: 85 ML/MIN/1.73
GLUCOSE BLD-MCNC: 92 MG/DL (ref 65–99)
HBA1C MFR BLD: 5.5 % (ref 4.8–5.6)
HCT VFR BLD AUTO: 41 % (ref 35.6–45.5)
HGB BLD-MCNC: 13.6 G/DL (ref 11.9–15.5)
IMM GRANULOCYTES # BLD: 0.01 10*3/MM3 (ref 0–0.03)
IMM GRANULOCYTES NFR BLD: 0.2 % (ref 0–0.5)
INR PPP: 1.18 (ref 0.9–1.1)
LYMPHOCYTES # BLD AUTO: 1.88 10*3/MM3 (ref 0.9–4.8)
LYMPHOCYTES NFR BLD AUTO: 44.9 % (ref 19.6–45.3)
MCH RBC QN AUTO: 33.3 PG (ref 26.9–32)
MCHC RBC AUTO-ENTMCNC: 33.2 G/DL (ref 32.4–36.3)
MCV RBC AUTO: 100.5 FL (ref 80.5–98.2)
MONOCYTES # BLD AUTO: 0.49 10*3/MM3 (ref 0.2–1.2)
MONOCYTES NFR BLD AUTO: 11.7 % (ref 5–12)
NEUTROPHILS # BLD AUTO: 1.66 10*3/MM3 (ref 1.9–8.1)
NEUTROPHILS NFR BLD AUTO: 39.6 % (ref 42.7–76)
PLATELET # BLD AUTO: 175 10*3/MM3 (ref 140–500)
PMV BLD AUTO: 10.4 FL (ref 6–12)
POTASSIUM BLD-SCNC: 4.3 MMOL/L (ref 3.5–5.2)
PROTHROMBIN TIME: 14.8 SECONDS (ref 11.7–14.2)
RBC # BLD AUTO: 4.08 10*6/MM3 (ref 3.9–5.2)
SODIUM BLD-SCNC: 144 MMOL/L (ref 136–145)
VIT B12 BLD-MCNC: 1077 PG/ML (ref 211–946)
WBC NRBC COR # BLD: 4.19 10*3/MM3 (ref 4.5–10.7)

## 2018-04-28 PROCEDURE — 85025 COMPLETE CBC W/AUTO DIFF WBC: CPT | Performed by: INTERNAL MEDICINE

## 2018-04-28 PROCEDURE — 25010000002 LORAZEPAM PER 2 MG: Performed by: INTERNAL MEDICINE

## 2018-04-28 PROCEDURE — G0378 HOSPITAL OBSERVATION PER HR: HCPCS

## 2018-04-28 PROCEDURE — G8980 MOBILITY D/C STATUS: HCPCS

## 2018-04-28 PROCEDURE — G0480 DRUG TEST DEF 1-7 CLASSES: HCPCS | Performed by: INTERNAL MEDICINE

## 2018-04-28 PROCEDURE — G8998 SWALLOW D/C STATUS: HCPCS

## 2018-04-28 PROCEDURE — 97162 PT EVAL MOD COMPLEX 30 MIN: CPT

## 2018-04-28 PROCEDURE — 25010000002 ENOXAPARIN PER 10 MG: Performed by: INTERNAL MEDICINE

## 2018-04-28 PROCEDURE — 99214 OFFICE O/P EST MOD 30 MIN: CPT | Performed by: INTERNAL MEDICINE

## 2018-04-28 PROCEDURE — G8979 MOBILITY GOAL STATUS: HCPCS

## 2018-04-28 PROCEDURE — 92610 EVALUATE SWALLOWING FUNCTION: CPT | Performed by: SPEECH-LANGUAGE PATHOLOGIST

## 2018-04-28 PROCEDURE — 97110 THERAPEUTIC EXERCISES: CPT

## 2018-04-28 PROCEDURE — 25810000003 POTASSIUM CHLORIDE PER 2 MEQ: Performed by: INTERNAL MEDICINE

## 2018-04-28 PROCEDURE — 96375 TX/PRO/DX INJ NEW DRUG ADDON: CPT

## 2018-04-28 PROCEDURE — G8978 MOBILITY CURRENT STATUS: HCPCS

## 2018-04-28 PROCEDURE — 82306 VITAMIN D 25 HYDROXY: CPT | Performed by: INTERNAL MEDICINE

## 2018-04-28 PROCEDURE — 82746 ASSAY OF FOLIC ACID SERUM: CPT | Performed by: INTERNAL MEDICINE

## 2018-04-28 PROCEDURE — 82607 VITAMIN B-12: CPT | Performed by: INTERNAL MEDICINE

## 2018-04-28 PROCEDURE — 96361 HYDRATE IV INFUSION ADD-ON: CPT

## 2018-04-28 PROCEDURE — G8996 SWALLOW CURRENT STATUS: HCPCS

## 2018-04-28 PROCEDURE — 80048 BASIC METABOLIC PNL TOTAL CA: CPT | Performed by: INTERNAL MEDICINE

## 2018-04-28 PROCEDURE — 96372 THER/PROPH/DIAG INJ SC/IM: CPT

## 2018-04-28 PROCEDURE — 87086 URINE CULTURE/COLONY COUNT: CPT | Performed by: INTERNAL MEDICINE

## 2018-04-28 PROCEDURE — 85730 THROMBOPLASTIN TIME PARTIAL: CPT | Performed by: INTERNAL MEDICINE

## 2018-04-28 PROCEDURE — 85610 PROTHROMBIN TIME: CPT | Performed by: INTERNAL MEDICINE

## 2018-04-28 PROCEDURE — 83036 HEMOGLOBIN GLYCOSYLATED A1C: CPT | Performed by: INTERNAL MEDICINE

## 2018-04-28 PROCEDURE — 25010000002 MORPHINE PER 10 MG: Performed by: INTERNAL MEDICINE

## 2018-04-28 PROCEDURE — 96376 TX/PRO/DX INJ SAME DRUG ADON: CPT

## 2018-04-28 PROCEDURE — G8997 SWALLOW GOAL STATUS: HCPCS

## 2018-04-28 RX ORDER — ZOLPIDEM TARTRATE 5 MG/1
10 TABLET ORAL NIGHTLY PRN
Status: DISCONTINUED | OUTPATIENT
Start: 2018-04-28 | End: 2018-04-29 | Stop reason: HOSPADM

## 2018-04-28 RX ORDER — OLANZAPINE 2.5 MG/1
2.5 TABLET ORAL NIGHTLY
Status: DISCONTINUED | OUTPATIENT
Start: 2018-04-28 | End: 2018-04-29 | Stop reason: HOSPADM

## 2018-04-28 RX ORDER — CARBAMAZEPINE 200 MG/1
200 TABLET ORAL 2 TIMES DAILY
Status: DISCONTINUED | OUTPATIENT
Start: 2018-04-28 | End: 2018-04-29 | Stop reason: HOSPADM

## 2018-04-28 RX ORDER — PANTOPRAZOLE SODIUM 40 MG/1
40 TABLET, DELAYED RELEASE ORAL
Status: DISCONTINUED | OUTPATIENT
Start: 2018-04-28 | End: 2018-04-29 | Stop reason: HOSPADM

## 2018-04-28 RX ORDER — TRAMADOL HYDROCHLORIDE 50 MG/1
50 TABLET ORAL EVERY 6 HOURS PRN
Status: DISCONTINUED | OUTPATIENT
Start: 2018-04-28 | End: 2018-04-29 | Stop reason: HOSPADM

## 2018-04-28 RX ORDER — SODIUM CHLORIDE AND POTASSIUM CHLORIDE 150; 450 MG/100ML; MG/100ML
75 INJECTION, SOLUTION INTRAVENOUS CONTINUOUS
Status: DISCONTINUED | OUTPATIENT
Start: 2018-04-28 | End: 2018-04-29 | Stop reason: HOSPADM

## 2018-04-28 RX ORDER — FLUOXETINE HYDROCHLORIDE 20 MG/1
80 CAPSULE ORAL DAILY
Status: DISCONTINUED | OUTPATIENT
Start: 2018-04-29 | End: 2018-04-29 | Stop reason: HOSPADM

## 2018-04-28 RX ADMIN — METOPROLOL TARTRATE 25 MG: 25 TABLET ORAL at 21:35

## 2018-04-28 RX ADMIN — MORPHINE SULFATE 1 MG: 2 INJECTION, SOLUTION INTRAMUSCULAR; INTRAVENOUS at 19:51

## 2018-04-28 RX ADMIN — HYDROCODONE BITARTRATE AND ACETAMINOPHEN 1 TABLET: 10; 325 TABLET ORAL at 03:09

## 2018-04-28 RX ADMIN — HYDROCODONE BITARTRATE AND ACETAMINOPHEN 1 TABLET: 10; 325 TABLET ORAL at 06:52

## 2018-04-28 RX ADMIN — PANTOPRAZOLE SODIUM 40 MG: 40 TABLET, DELAYED RELEASE ORAL at 17:31

## 2018-04-28 RX ADMIN — ENOXAPARIN SODIUM 40 MG: 40 INJECTION SUBCUTANEOUS at 19:51

## 2018-04-28 RX ADMIN — LORAZEPAM 0.5 MG: 2 INJECTION INTRAMUSCULAR; INTRAVENOUS at 03:47

## 2018-04-28 RX ADMIN — CLONAZEPAM 1 MG: 1 TABLET ORAL at 13:46

## 2018-04-28 RX ADMIN — DOCUSATE SODIUM 100 MG: 100 CAPSULE, LIQUID FILLED ORAL at 08:18

## 2018-04-28 RX ADMIN — CIPROFLOXACIN 500 MG: 250 TABLET, FILM COATED ORAL at 08:19

## 2018-04-28 RX ADMIN — POTASSIUM CHLORIDE AND SODIUM CHLORIDE 75 ML/HR: 450; 150 INJECTION, SOLUTION INTRAVENOUS at 17:25

## 2018-04-28 RX ADMIN — CLOPIDOGREL 75 MG: 75 TABLET, FILM COATED ORAL at 08:19

## 2018-04-28 RX ADMIN — FLUOXETINE HYDROCHLORIDE 60 MG: 20 CAPSULE ORAL at 08:19

## 2018-04-28 RX ADMIN — CARBAMAZEPINE 200 MG: 200 TABLET ORAL at 08:19

## 2018-04-28 RX ADMIN — CIPROFLOXACIN 500 MG: 250 TABLET, FILM COATED ORAL at 00:26

## 2018-04-28 RX ADMIN — POTASSIUM CHLORIDE AND SODIUM CHLORIDE 75 ML/HR: 450; 150 INJECTION, SOLUTION INTRAVENOUS at 03:41

## 2018-04-28 RX ADMIN — METOPROLOL TARTRATE 25 MG: 25 TABLET ORAL at 08:19

## 2018-04-28 RX ADMIN — CARBAMAZEPINE 200 MG: 200 TABLET ORAL at 21:35

## 2018-04-28 RX ADMIN — MELOXICAM 7.5 MG: 7.5 TABLET ORAL at 08:20

## 2018-04-28 RX ADMIN — CIPROFLOXACIN 500 MG: 250 TABLET, FILM COATED ORAL at 21:35

## 2018-04-28 RX ADMIN — HYDROCODONE BITARTRATE AND ACETAMINOPHEN 1 TABLET: 10; 325 TABLET ORAL at 17:26

## 2018-04-28 RX ADMIN — CHOLECALCIFEROL TAB 10 MCG (400 UNIT) 400 UNITS: 10 TAB at 08:18

## 2018-04-28 RX ADMIN — OLANZAPINE 2.5 MG: 2.5 TABLET, FILM COATED ORAL at 21:35

## 2018-04-28 RX ADMIN — CLONAZEPAM 1 MG: 1 TABLET ORAL at 08:18

## 2018-04-28 RX ADMIN — CLONAZEPAM 1 MG: 1 TABLET ORAL at 22:47

## 2018-04-29 ENCOUNTER — ANESTHESIA EVENT (OUTPATIENT)
Dept: GASTROENTEROLOGY | Facility: HOSPITAL | Age: 63
End: 2018-04-29

## 2018-04-29 ENCOUNTER — ANESTHESIA (OUTPATIENT)
Dept: GASTROENTEROLOGY | Facility: HOSPITAL | Age: 63
End: 2018-04-29

## 2018-04-29 VITALS
SYSTOLIC BLOOD PRESSURE: 118 MMHG | WEIGHT: 163.2 LBS | BODY MASS INDEX: 28.92 KG/M2 | OXYGEN SATURATION: 99 % | TEMPERATURE: 97.4 F | HEIGHT: 63 IN | DIASTOLIC BLOOD PRESSURE: 75 MMHG | HEART RATE: 62 BPM | RESPIRATION RATE: 20 BRPM

## 2018-04-29 PROBLEM — K29.70 GASTRITIS: Status: ACTIVE | Noted: 2018-04-29

## 2018-04-29 LAB
ALBUMIN SERPL-MCNC: 3.5 G/DL (ref 3.5–5.2)
ALBUMIN/GLOB SERPL: 1.3 G/DL
ALP SERPL-CCNC: 88 U/L (ref 39–117)
ALT SERPL W P-5'-P-CCNC: 19 U/L (ref 1–33)
ANION GAP SERPL CALCULATED.3IONS-SCNC: 9.2 MMOL/L
APTT PPP: 30.3 SECONDS (ref 22.7–35.4)
AST SERPL-CCNC: 27 U/L (ref 1–32)
BASOPHILS # BLD AUTO: 0.01 10*3/MM3 (ref 0–0.2)
BASOPHILS NFR BLD AUTO: 0.2 % (ref 0–1.5)
BILIRUB SERPL-MCNC: 0.2 MG/DL (ref 0.1–1.2)
BUN BLD-MCNC: 9 MG/DL (ref 8–23)
BUN/CREAT SERPL: 11.8 (ref 7–25)
CALCIUM SPEC-SCNC: 9.2 MG/DL (ref 8.6–10.5)
CHLORIDE SERPL-SCNC: 107 MMOL/L (ref 98–107)
CO2 SERPL-SCNC: 26.8 MMOL/L (ref 22–29)
CREAT BLD-MCNC: 0.76 MG/DL (ref 0.57–1)
DEPRECATED RDW RBC AUTO: 44 FL (ref 37–54)
EOSINOPHIL # BLD AUTO: 0.15 10*3/MM3 (ref 0–0.7)
EOSINOPHIL NFR BLD AUTO: 3.2 % (ref 0.3–6.2)
ERYTHROCYTE [DISTWIDTH] IN BLOOD BY AUTOMATED COUNT: 12.1 % (ref 11.7–13)
GFR SERPL CREATININE-BSD FRML MDRD: 78 ML/MIN/1.73
GLOBULIN UR ELPH-MCNC: 2.6 GM/DL
GLUCOSE BLD-MCNC: 132 MG/DL (ref 65–99)
HCT VFR BLD AUTO: 39.9 % (ref 35.6–45.5)
HGB BLD-MCNC: 13.3 G/DL (ref 11.9–15.5)
IMM GRANULOCYTES # BLD: 0.01 10*3/MM3 (ref 0–0.03)
IMM GRANULOCYTES NFR BLD: 0.2 % (ref 0–0.5)
INR PPP: 1.2 (ref 0.9–1.1)
LYMPHOCYTES # BLD AUTO: 2.25 10*3/MM3 (ref 0.9–4.8)
LYMPHOCYTES NFR BLD AUTO: 47.3 % (ref 19.6–45.3)
MCH RBC QN AUTO: 33.1 PG (ref 26.9–32)
MCHC RBC AUTO-ENTMCNC: 33.3 G/DL (ref 32.4–36.3)
MCV RBC AUTO: 99.3 FL (ref 80.5–98.2)
MONOCYTES # BLD AUTO: 0.66 10*3/MM3 (ref 0.2–1.2)
MONOCYTES NFR BLD AUTO: 13.9 % (ref 5–12)
NEUTROPHILS # BLD AUTO: 1.69 10*3/MM3 (ref 1.9–8.1)
NEUTROPHILS NFR BLD AUTO: 35.4 % (ref 42.7–76)
PLATELET # BLD AUTO: 170 10*3/MM3 (ref 140–500)
PMV BLD AUTO: 10.3 FL (ref 6–12)
POTASSIUM BLD-SCNC: 4.6 MMOL/L (ref 3.5–5.2)
PROT SERPL-MCNC: 6.1 G/DL (ref 6–8.5)
PROTHROMBIN TIME: 15 SECONDS (ref 11.7–14.2)
RBC # BLD AUTO: 4.02 10*6/MM3 (ref 3.9–5.2)
SODIUM BLD-SCNC: 143 MMOL/L (ref 136–145)
WBC NRBC COR # BLD: 4.76 10*3/MM3 (ref 4.5–10.7)

## 2018-04-29 PROCEDURE — 85610 PROTHROMBIN TIME: CPT | Performed by: INTERNAL MEDICINE

## 2018-04-29 PROCEDURE — 25010000002 PROPOFOL 10 MG/ML EMULSION: Performed by: ANESTHESIOLOGY

## 2018-04-29 PROCEDURE — 43235 EGD DIAGNOSTIC BRUSH WASH: CPT | Performed by: INTERNAL MEDICINE

## 2018-04-29 PROCEDURE — G0378 HOSPITAL OBSERVATION PER HR: HCPCS

## 2018-04-29 PROCEDURE — 80053 COMPREHEN METABOLIC PANEL: CPT | Performed by: INTERNAL MEDICINE

## 2018-04-29 PROCEDURE — 25810000003 POTASSIUM CHLORIDE PER 2 MEQ: Performed by: INTERNAL MEDICINE

## 2018-04-29 PROCEDURE — 63710000001 HYDROCODONE-ACETAMINOPHEN 10-325 MG TABLET: Performed by: INTERNAL MEDICINE

## 2018-04-29 PROCEDURE — 85025 COMPLETE CBC W/AUTO DIFF WBC: CPT | Performed by: INTERNAL MEDICINE

## 2018-04-29 PROCEDURE — 90791 PSYCH DIAGNOSTIC EVALUATION: CPT | Performed by: SOCIAL WORKER

## 2018-04-29 PROCEDURE — A9270 NON-COVERED ITEM OR SERVICE: HCPCS | Performed by: INTERNAL MEDICINE

## 2018-04-29 PROCEDURE — 85730 THROMBOPLASTIN TIME PARTIAL: CPT | Performed by: INTERNAL MEDICINE

## 2018-04-29 PROCEDURE — 63710000001 SUCRALFATE 1 GM/10ML SUSPENSION: Performed by: INTERNAL MEDICINE

## 2018-04-29 PROCEDURE — 96361 HYDRATE IV INFUSION ADD-ON: CPT

## 2018-04-29 RX ORDER — SUCRALFATE ORAL 1 G/10ML
1 SUSPENSION ORAL 2 TIMES DAILY
Status: DISCONTINUED | OUTPATIENT
Start: 2018-04-29 | End: 2018-04-29 | Stop reason: HOSPADM

## 2018-04-29 RX ORDER — TRAMADOL HYDROCHLORIDE 50 MG/1
50 TABLET ORAL EVERY 6 HOURS PRN
Start: 2018-04-29 | End: 2018-05-08

## 2018-04-29 RX ORDER — CIPROFLOXACIN 500 MG/1
500 TABLET, FILM COATED ORAL EVERY 12 HOURS SCHEDULED
Qty: 16 TABLET | Refills: 0 | Status: SHIPPED | OUTPATIENT
Start: 2018-04-29 | End: 2018-05-07

## 2018-04-29 RX ORDER — ZOLPIDEM TARTRATE 10 MG/1
10 TABLET ORAL NIGHTLY PRN
Start: 2018-04-29 | End: 2018-05-08

## 2018-04-29 RX ORDER — SODIUM CHLORIDE, SODIUM LACTATE, POTASSIUM CHLORIDE, CALCIUM CHLORIDE 600; 310; 30; 20 MG/100ML; MG/100ML; MG/100ML; MG/100ML
1000 INJECTION, SOLUTION INTRAVENOUS CONTINUOUS
Status: DISCONTINUED | OUTPATIENT
Start: 2018-04-29 | End: 2018-04-29 | Stop reason: HOSPADM

## 2018-04-29 RX ORDER — OLANZAPINE 2.5 MG/1
2.5 TABLET ORAL NIGHTLY
Qty: 30 TABLET | Refills: 5 | Status: SHIPPED | OUTPATIENT
Start: 2018-04-29

## 2018-04-29 RX ORDER — FLUOXETINE HYDROCHLORIDE 20 MG/1
40 CAPSULE ORAL DAILY
Qty: 60 CAPSULE | Refills: 2 | Status: SHIPPED | OUTPATIENT
Start: 2018-04-29

## 2018-04-29 RX ORDER — GLYCOPYRROLATE 0.2 MG/ML
INJECTION INTRAMUSCULAR; INTRAVENOUS AS NEEDED
Status: DISCONTINUED | OUTPATIENT
Start: 2018-04-29 | End: 2018-04-29 | Stop reason: SURG

## 2018-04-29 RX ORDER — PROPOFOL 10 MG/ML
VIAL (ML) INTRAVENOUS CONTINUOUS PRN
Status: DISCONTINUED | OUTPATIENT
Start: 2018-04-29 | End: 2018-04-29 | Stop reason: SURG

## 2018-04-29 RX ORDER — OMEPRAZOLE 40 MG/1
40 CAPSULE, DELAYED RELEASE ORAL 2 TIMES DAILY
Qty: 60 CAPSULE | Refills: 5 | Status: SHIPPED | OUTPATIENT
Start: 2018-04-29

## 2018-04-29 RX ORDER — CARBAMAZEPINE 200 MG/1
200 TABLET ORAL 2 TIMES DAILY
Start: 2018-04-29

## 2018-04-29 RX ORDER — PROPOFOL 10 MG/ML
VIAL (ML) INTRAVENOUS AS NEEDED
Status: DISCONTINUED | OUTPATIENT
Start: 2018-04-29 | End: 2018-04-29 | Stop reason: SURG

## 2018-04-29 RX ORDER — ACETAMINOPHEN 325 MG/1
650 TABLET ORAL EVERY 4 HOURS PRN
Start: 2018-04-29

## 2018-04-29 RX ORDER — SODIUM CHLORIDE 0.9 % (FLUSH) 0.9 %
3 SYRINGE (ML) INJECTION AS NEEDED
Status: DISCONTINUED | OUTPATIENT
Start: 2018-04-29 | End: 2018-04-29 | Stop reason: HOSPADM

## 2018-04-29 RX ADMIN — FLUOXETINE HYDROCHLORIDE 80 MG: 20 CAPSULE ORAL at 10:47

## 2018-04-29 RX ADMIN — SUCRALFATE 1 G: 1 SUSPENSION ORAL at 13:22

## 2018-04-29 RX ADMIN — MELOXICAM 7.5 MG: 7.5 TABLET ORAL at 10:48

## 2018-04-29 RX ADMIN — PANTOPRAZOLE SODIUM 40 MG: 40 TABLET, DELAYED RELEASE ORAL at 10:46

## 2018-04-29 RX ADMIN — CIPROFLOXACIN 500 MG: 250 TABLET, FILM COATED ORAL at 10:47

## 2018-04-29 RX ADMIN — DOCUSATE SODIUM 100 MG: 100 CAPSULE, LIQUID FILLED ORAL at 10:46

## 2018-04-29 RX ADMIN — PROPOFOL 140 MCG/KG/MIN: 10 INJECTION, EMULSION INTRAVENOUS at 09:55

## 2018-04-29 RX ADMIN — PROPOFOL 80 MG: 10 INJECTION, EMULSION INTRAVENOUS at 09:55

## 2018-04-29 RX ADMIN — CARBAMAZEPINE 200 MG: 200 TABLET ORAL at 10:48

## 2018-04-29 RX ADMIN — GLYCOPYRROLATE 0.2 MCG: 0.2 INJECTION INTRAMUSCULAR; INTRAVENOUS at 09:54

## 2018-04-29 RX ADMIN — SODIUM CHLORIDE, POTASSIUM CHLORIDE, SODIUM LACTATE AND CALCIUM CHLORIDE 1000 ML: 600; 310; 30; 20 INJECTION, SOLUTION INTRAVENOUS at 08:44

## 2018-04-29 RX ADMIN — CLOPIDOGREL 75 MG: 75 TABLET, FILM COATED ORAL at 10:47

## 2018-04-29 RX ADMIN — POTASSIUM CHLORIDE AND SODIUM CHLORIDE 75 ML/HR: 450; 150 INJECTION, SOLUTION INTRAVENOUS at 05:33

## 2018-04-29 RX ADMIN — SODIUM CHLORIDE, POTASSIUM CHLORIDE, SODIUM LACTATE AND CALCIUM CHLORIDE: 600; 310; 30; 20 INJECTION, SOLUTION INTRAVENOUS at 09:50

## 2018-04-29 RX ADMIN — CHOLECALCIFEROL TAB 10 MCG (400 UNIT) 400 UNITS: 10 TAB at 10:46

## 2018-04-29 RX ADMIN — HYDROCODONE BITARTRATE AND ACETAMINOPHEN 1 TABLET: 10; 325 TABLET ORAL at 13:22

## 2018-04-29 NOTE — ANESTHESIA PREPROCEDURE EVALUATION
Anesthesia Evaluation     Patient summary reviewed and Nursing notes reviewed                Airway   Mallampati: II  TM distance: >3 FB  Neck ROM: full  No difficulty expected  Dental    (+) edentulous    Pulmonary    (+) sleep apnea (untreated),   Cardiovascular     ECG reviewed  PT is on anticoagulation therapy  Patient on routine beta blocker    (+) hypertension, past MI , CAD, dysrhythmias Atrial Fib, hyperlipidemia,       Neuro/Psych  (+) TIA, CVA, syncope, psychiatric history Anxiety, Bipolar and Depression,     GI/Hepatic/Renal/Endo    (+) obesity,  hiatal hernia, GERD (esophageal stricture history and dysphagia), PUD (anastomotic ulcer history, s/p gastric bypass),  renal disease (resolved) ARF, diabetes mellitus (diet) well controlled,     Musculoskeletal     Abdominal    Substance History   (+) drug use (norco 10s for cLBP)     OB/GYN          Other   (+) arthritis                   Anesthesia Plan    ASA 3     MAC     Anesthetic plan and risks discussed with patient.

## 2018-04-29 NOTE — ANESTHESIA POSTPROCEDURE EVALUATION
Patient: Milady Wilson    Procedure Summary     Date:  04/29/18 Room / Location:  [facility] ENDOSCOPY 1 / [facility] ENDOSCOPY    Anesthesia Start:  0950 Anesthesia Stop:  1006    Procedure:  ESOPHAGOGASTRODUODENOSCOPY (N/A Esophagus) Diagnosis:       Melena      (Melena [K92.1])    Surgeon:  Efren Mcmillan MD Provider:  Kaylee Saldivar MD    Anesthesia Type:  MAC ASA Status:  3          Anesthesia Type: MAC  Last vitals  BP   105/62 (04/29/18 1006)   Temp   36.8 °C (98.2 °F) (04/29/18 1006)   Pulse   58 (04/29/18 1006)   Resp   16 (04/29/18 1006)     SpO2   96 % (04/29/18 1006)     Post Anesthesia Care and Evaluation    Patient location during evaluation: bedside  Patient participation: complete - patient participated  Level of consciousness: awake and alert  Pain management: adequate  Airway patency: patent  Anesthetic complications: No anesthetic complications  PONV Status: controlled  Cardiovascular status: acceptable  Respiratory status: acceptable  Hydration status: acceptable

## 2018-04-30 LAB — BACTERIA SPEC AEROBE CULT: NO GROWTH

## 2018-05-07 LAB — AMPHETAMINES SPEC QL: NEGATIVE NG/ML

## 2018-06-11 RX ORDER — CLOPIDOGREL BISULFATE 75 MG/1
TABLET ORAL
Qty: 30 TABLET | Refills: 1 | OUTPATIENT
Start: 2018-06-11

## 2018-06-29 ENCOUNTER — CLINICAL SUPPORT (OUTPATIENT)
Dept: ORTHOPEDIC SURGERY | Facility: CLINIC | Age: 63
End: 2018-06-29

## 2018-06-29 VITALS — TEMPERATURE: 97.7 F | WEIGHT: 165 LBS | BODY MASS INDEX: 29.23 KG/M2 | HEIGHT: 63 IN

## 2018-06-29 DIAGNOSIS — M70.61 TROCHANTERIC BURSITIS, RIGHT HIP: Primary | ICD-10-CM

## 2018-06-29 DIAGNOSIS — M70.62 TROCHANTERIC BURSITIS, LEFT HIP: ICD-10-CM

## 2018-06-29 PROCEDURE — 99213 OFFICE O/P EST LOW 20 MIN: CPT | Performed by: NURSE PRACTITIONER

## 2018-06-29 PROCEDURE — 73502 X-RAY EXAM HIP UNI 2-3 VIEWS: CPT | Performed by: NURSE PRACTITIONER

## 2018-06-29 PROCEDURE — 20610 DRAIN/INJ JOINT/BURSA W/O US: CPT | Performed by: NURSE PRACTITIONER

## 2018-06-29 RX ORDER — LIDOCAINE HYDROCHLORIDE 10 MG/ML
4 INJECTION, SOLUTION EPIDURAL; INFILTRATION; INTRACAUDAL; PERINEURAL
Status: COMPLETED | OUTPATIENT
Start: 2018-06-29 | End: 2018-06-29

## 2018-06-29 RX ORDER — METHYLPREDNISOLONE ACETATE 80 MG/ML
80 INJECTION, SUSPENSION INTRA-ARTICULAR; INTRALESIONAL; INTRAMUSCULAR; SOFT TISSUE
Status: COMPLETED | OUTPATIENT
Start: 2018-06-29 | End: 2018-06-29

## 2018-06-29 RX ADMIN — LIDOCAINE HYDROCHLORIDE 4 ML: 10 INJECTION, SOLUTION EPIDURAL; INFILTRATION; INTRACAUDAL; PERINEURAL at 11:42

## 2018-06-29 RX ADMIN — LIDOCAINE HYDROCHLORIDE 4 ML: 10 INJECTION, SOLUTION EPIDURAL; INFILTRATION; INTRACAUDAL; PERINEURAL at 11:13

## 2018-06-29 RX ADMIN — METHYLPREDNISOLONE ACETATE 80 MG: 80 INJECTION, SUSPENSION INTRA-ARTICULAR; INTRALESIONAL; INTRAMUSCULAR; SOFT TISSUE at 11:13

## 2018-06-29 RX ADMIN — METHYLPREDNISOLONE ACETATE 80 MG: 80 INJECTION, SUSPENSION INTRA-ARTICULAR; INTRALESIONAL; INTRAMUSCULAR; SOFT TISSUE at 11:42
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
## 2018-09-19 ENCOUNTER — APPOINTMENT (OUTPATIENT)
Dept: GENERAL RADIOLOGY | Facility: HOSPITAL | Age: 63
End: 2018-09-19

## 2018-09-19 ENCOUNTER — HOSPITAL ENCOUNTER (INPATIENT)
Facility: HOSPITAL | Age: 63
LOS: 8 days | Discharge: NURSING, REHAB OR LONG-TERM CARE | End: 2018-09-28
Attending: EMERGENCY MEDICINE | Admitting: SURGERY

## 2018-09-19 DIAGNOSIS — L03.115 CELLULITIS OF RIGHT LOWER EXTREMITY: Primary | ICD-10-CM

## 2018-09-19 DIAGNOSIS — L97.519 SKIN ULCER OF SECOND TOE, RIGHT, WITH UNSPECIFIED SEVERITY (HCC): ICD-10-CM

## 2018-09-19 DIAGNOSIS — R55 SYNCOPE, UNSPECIFIED SYNCOPE TYPE: ICD-10-CM

## 2018-09-19 DIAGNOSIS — G89.4 CHRONIC PAIN SYNDROME: ICD-10-CM

## 2018-09-19 DIAGNOSIS — I10 ESSENTIAL HYPERTENSION: ICD-10-CM

## 2018-09-19 DIAGNOSIS — Z74.09 IMPAIRED FUNCTIONAL MOBILITY AND ACTIVITY TOLERANCE: ICD-10-CM

## 2018-09-19 DIAGNOSIS — M86.9 OSTEOMYELITIS OF TOE OF RIGHT FOOT (HCC): ICD-10-CM

## 2018-09-19 DIAGNOSIS — Z86.69: ICD-10-CM

## 2018-09-19 DIAGNOSIS — E66.9 OBESITY (BMI 30.0-34.9): ICD-10-CM

## 2018-09-19 DIAGNOSIS — F43.9 SITUATIONAL STRESS: ICD-10-CM

## 2018-09-19 DIAGNOSIS — K59.01 SLOW TRANSIT CONSTIPATION: ICD-10-CM

## 2018-09-19 DIAGNOSIS — K57.10 DIVERTICULOSIS OF SMALL INTESTINE WITHOUT HEMORRHAGE: ICD-10-CM

## 2018-09-19 LAB
ALBUMIN SERPL-MCNC: 3.5 G/DL (ref 3.5–5.2)
ALBUMIN/GLOB SERPL: 1.2 G/DL
ALP SERPL-CCNC: 107 U/L (ref 39–117)
ALT SERPL W P-5'-P-CCNC: 48 U/L (ref 1–33)
ANION GAP SERPL CALCULATED.3IONS-SCNC: 12.2 MMOL/L
AST SERPL-CCNC: 38 U/L (ref 1–32)
BASOPHILS # BLD AUTO: 0.02 10*3/MM3 (ref 0–0.2)
BASOPHILS NFR BLD AUTO: 0.2 % (ref 0–1.5)
BILIRUB SERPL-MCNC: 0.2 MG/DL (ref 0.1–1.2)
BUN BLD-MCNC: 33 MG/DL (ref 8–23)
BUN/CREAT SERPL: 31.7 (ref 7–25)
CALCIUM SPEC-SCNC: 8.6 MG/DL (ref 8.6–10.5)
CHLORIDE SERPL-SCNC: 103 MMOL/L (ref 98–107)
CO2 SERPL-SCNC: 24.8 MMOL/L (ref 22–29)
CREAT BLD-MCNC: 1.04 MG/DL (ref 0.57–1)
CRP SERPL-MCNC: 5.79 MG/DL (ref 0–0.5)
DEPRECATED RDW RBC AUTO: 44.9 FL (ref 37–54)
EOSINOPHIL # BLD AUTO: 0.21 10*3/MM3 (ref 0–0.7)
EOSINOPHIL NFR BLD AUTO: 2.5 % (ref 0.3–6.2)
ERYTHROCYTE [DISTWIDTH] IN BLOOD BY AUTOMATED COUNT: 12.6 % (ref 11.7–13)
ERYTHROCYTE [SEDIMENTATION RATE] IN BLOOD: 45 MM/HR (ref 0–30)
GFR SERPL CREATININE-BSD FRML MDRD: 54 ML/MIN/1.73
GLOBULIN UR ELPH-MCNC: 3 GM/DL
GLUCOSE BLD-MCNC: 86 MG/DL (ref 65–99)
HCT VFR BLD AUTO: 32.7 % (ref 35.6–45.5)
HGB BLD-MCNC: 10.8 G/DL (ref 11.9–15.5)
IMM GRANULOCYTES # BLD: 0.03 10*3/MM3 (ref 0–0.03)
IMM GRANULOCYTES NFR BLD: 0.4 % (ref 0–0.5)
LYMPHOCYTES # BLD AUTO: 1.73 10*3/MM3 (ref 0.9–4.8)
LYMPHOCYTES NFR BLD AUTO: 20.2 % (ref 19.6–45.3)
MCH RBC QN AUTO: 32.7 PG (ref 26.9–32)
MCHC RBC AUTO-ENTMCNC: 33 G/DL (ref 32.4–36.3)
MCV RBC AUTO: 99.1 FL (ref 80.5–98.2)
MONOCYTES # BLD AUTO: 1.25 10*3/MM3 (ref 0.2–1.2)
MONOCYTES NFR BLD AUTO: 14.6 % (ref 5–12)
NEUTROPHILS # BLD AUTO: 5.35 10*3/MM3 (ref 1.9–8.1)
NEUTROPHILS NFR BLD AUTO: 62.5 % (ref 42.7–76)
PLATELET # BLD AUTO: 252 10*3/MM3 (ref 140–500)
PMV BLD AUTO: 9.7 FL (ref 6–12)
POTASSIUM BLD-SCNC: 3.4 MMOL/L (ref 3.5–5.2)
PROT SERPL-MCNC: 6.5 G/DL (ref 6–8.5)
RBC # BLD AUTO: 3.3 10*6/MM3 (ref 3.9–5.2)
SODIUM BLD-SCNC: 140 MMOL/L (ref 136–145)
WBC NRBC COR # BLD: 8.56 10*3/MM3 (ref 4.5–10.7)

## 2018-09-19 PROCEDURE — 99285 EMERGENCY DEPT VISIT HI MDM: CPT

## 2018-09-19 PROCEDURE — 85025 COMPLETE CBC W/AUTO DIFF WBC: CPT | Performed by: EMERGENCY MEDICINE

## 2018-09-19 PROCEDURE — 85652 RBC SED RATE AUTOMATED: CPT | Performed by: EMERGENCY MEDICINE

## 2018-09-19 PROCEDURE — 73630 X-RAY EXAM OF FOOT: CPT

## 2018-09-19 PROCEDURE — 80053 COMPREHEN METABOLIC PANEL: CPT | Performed by: EMERGENCY MEDICINE

## 2018-09-19 PROCEDURE — 86140 C-REACTIVE PROTEIN: CPT | Performed by: EMERGENCY MEDICINE

## 2018-09-19 PROCEDURE — 80156 ASSAY CARBAMAZEPINE TOTAL: CPT | Performed by: INTERNAL MEDICINE

## 2018-09-20 ENCOUNTER — APPOINTMENT (OUTPATIENT)
Dept: NUCLEAR MEDICINE | Facility: HOSPITAL | Age: 63
End: 2018-09-20

## 2018-09-20 ENCOUNTER — APPOINTMENT (OUTPATIENT)
Dept: CARDIOLOGY | Facility: HOSPITAL | Age: 63
End: 2018-09-20

## 2018-09-20 PROBLEM — L03.115 CELLULITIS OF RIGHT LOWER EXTREMITY: Status: ACTIVE | Noted: 2018-09-20

## 2018-09-20 PROBLEM — N18.30 STAGE 3 CHRONIC KIDNEY DISEASE (HCC): Status: ACTIVE | Noted: 2018-04-27

## 2018-09-20 LAB
25(OH)D3 SERPL-MCNC: 23.6 NG/ML (ref 30–100)
ANION GAP SERPL CALCULATED.3IONS-SCNC: 10.6 MMOL/L
BACTERIA UR QL AUTO: ABNORMAL /HPF
BASOPHILS # BLD AUTO: 0.02 10*3/MM3 (ref 0–0.2)
BASOPHILS NFR BLD AUTO: 0.3 % (ref 0–1.5)
BH CV ECHO MEAS - ACS: 2.4 CM
BH CV ECHO MEAS - AO MEAN PG (FULL): 5 MMHG
BH CV ECHO MEAS - AO MEAN PG: 10 MMHG
BH CV ECHO MEAS - AO ROOT AREA (BSA CORRECTED): 1.5
BH CV ECHO MEAS - AO ROOT AREA: 7.1 CM^2
BH CV ECHO MEAS - AO ROOT DIAM: 3 CM
BH CV ECHO MEAS - AO V2 MAX: 221 CM/SEC
BH CV ECHO MEAS - AO V2 MEAN: 146 CM/SEC
BH CV ECHO MEAS - AO V2 VTI: 43.5 CM
BH CV ECHO MEAS - AVA(I,A): 2.2 CM^2
BH CV ECHO MEAS - AVA(I,D): 2.2 CM^2
BH CV ECHO MEAS - BSA(HAYCOCK): 2.1 M^2
BH CV ECHO MEAS - BSA: 2 M^2
BH CV ECHO MEAS - BZI_BMI: 38.1 KILOGRAMS/M^2
BH CV ECHO MEAS - BZI_METRIC_HEIGHT: 160 CM
BH CV ECHO MEAS - BZI_METRIC_WEIGHT: 97.5 KG
BH CV ECHO MEAS - EDV(CUBED): 24.4 ML
BH CV ECHO MEAS - EDV(MOD-SP2): 62 ML
BH CV ECHO MEAS - EDV(MOD-SP4): 72 ML
BH CV ECHO MEAS - EDV(TEICH): 32.2 ML
BH CV ECHO MEAS - EF(CUBED): 62 %
BH CV ECHO MEAS - EF(MOD-BP): 68 %
BH CV ECHO MEAS - EF(MOD-SP2): 67.7 %
BH CV ECHO MEAS - EF(MOD-SP4): 66.7 %
BH CV ECHO MEAS - EF(TEICH): 55.3 %
BH CV ECHO MEAS - ESV(CUBED): 9.3 ML
BH CV ECHO MEAS - ESV(MOD-SP2): 20 ML
BH CV ECHO MEAS - ESV(MOD-SP4): 24 ML
BH CV ECHO MEAS - ESV(TEICH): 14.4 ML
BH CV ECHO MEAS - FS: 27.6 %
BH CV ECHO MEAS - IVS/LVPW: 1
BH CV ECHO MEAS - IVSD: 1.2 CM
BH CV ECHO MEAS - LAT PEAK E' VEL: 8 CM/SEC
BH CV ECHO MEAS - LV DIASTOLIC VOL/BSA (35-75): 36.1 ML/M^2
BH CV ECHO MEAS - LV MASS(C)D: 104.2 GRAMS
BH CV ECHO MEAS - LV MASS(C)DI: 52.2 GRAMS/M^2
BH CV ECHO MEAS - LV MEAN PG: 5 MMHG
BH CV ECHO MEAS - LV SYSTOLIC VOL/BSA (12-30): 12 ML/M^2
BH CV ECHO MEAS - LV V1 MAX: 156 CM/SEC
BH CV ECHO MEAS - LV V1 MEAN: 98.3 CM/SEC
BH CV ECHO MEAS - LV V1 VTI: 30.3 CM
BH CV ECHO MEAS - LVIDD: 2.9 CM
BH CV ECHO MEAS - LVIDS: 2.1 CM
BH CV ECHO MEAS - LVLD AP2: 7.3 CM
BH CV ECHO MEAS - LVLD AP4: 6.3 CM
BH CV ECHO MEAS - LVLS AP2: 6.1 CM
BH CV ECHO MEAS - LVLS AP4: 5.2 CM
BH CV ECHO MEAS - LVOT AREA (M): 3.1 CM^2
BH CV ECHO MEAS - LVOT AREA: 3.1 CM^2
BH CV ECHO MEAS - LVOT DIAM: 2 CM
BH CV ECHO MEAS - LVPWD: 1.2 CM
BH CV ECHO MEAS - MED PEAK E' VEL: 5 CM/SEC
BH CV ECHO MEAS - MR MAX PG: 63.7 MMHG
BH CV ECHO MEAS - MR MAX VEL: 398.5 CM/SEC
BH CV ECHO MEAS - MV A DUR: 0.11 SEC
BH CV ECHO MEAS - MV A MAX VEL: 98.7 CM/SEC
BH CV ECHO MEAS - MV DEC SLOPE: 319 CM/SEC^2
BH CV ECHO MEAS - MV DEC TIME: 0.24 SEC
BH CV ECHO MEAS - MV E MAX VEL: 72.8 CM/SEC
BH CV ECHO MEAS - MV E/A: 0.74
BH CV ECHO MEAS - MV MEAN PG: 2 MMHG
BH CV ECHO MEAS - MV P1/2T MAX VEL: 81.7 CM/SEC
BH CV ECHO MEAS - MV P1/2T: 75 MSEC
BH CV ECHO MEAS - MV V2 MEAN: 60.5 CM/SEC
BH CV ECHO MEAS - MV V2 VTI: 27.2 CM
BH CV ECHO MEAS - MVA P1/2T LCG: 2.7 CM^2
BH CV ECHO MEAS - MVA(P1/2T): 2.9 CM^2
BH CV ECHO MEAS - MVA(VTI): 3.5 CM^2
BH CV ECHO MEAS - PA ACC SLOPE: 8.6 CM/SEC^2
BH CV ECHO MEAS - PA ACC TIME: 0.14 SEC
BH CV ECHO MEAS - PA MAX PG (FULL): 3 MMHG
BH CV ECHO MEAS - PA MAX PG: 6.8 MMHG
BH CV ECHO MEAS - PA PR(ACCEL): 15.6 MMHG
BH CV ECHO MEAS - PA V2 MAX: 130 CM/SEC
BH CV ECHO MEAS - PULM A REVS DUR: 0.13 SEC
BH CV ECHO MEAS - PULM A REVS VEL: 28.6 CM/SEC
BH CV ECHO MEAS - PULM DIAS VEL: 37 CM/SEC
BH CV ECHO MEAS - PULM S/D: 1.5
BH CV ECHO MEAS - PULM SYS VEL: 56.8 CM/SEC
BH CV ECHO MEAS - PVA(V,A): 1.9 CM^2
BH CV ECHO MEAS - PVA(V,D): 1.9 CM^2
BH CV ECHO MEAS - QP/QS: 0.64
BH CV ECHO MEAS - RAP SYSTOLE: 3 MMHG
BH CV ECHO MEAS - RV MAX PG: 3.7 MMHG
BH CV ECHO MEAS - RV MEAN PG: 2 MMHG
BH CV ECHO MEAS - RV V1 MAX: 96.8 CM/SEC
BH CV ECHO MEAS - RV V1 MEAN: 59.3 CM/SEC
BH CV ECHO MEAS - RV V1 VTI: 24.1 CM
BH CV ECHO MEAS - RVOT AREA: 2.5 CM^2
BH CV ECHO MEAS - RVOT DIAM: 1.8 CM
BH CV ECHO MEAS - RVSP: 36.6 MMHG
BH CV ECHO MEAS - SI(AO): 154.2 ML/M^2
BH CV ECHO MEAS - SI(CUBED): 7.6 ML/M^2
BH CV ECHO MEAS - SI(LVOT): 47.7 ML/M^2
BH CV ECHO MEAS - SI(MOD-SP2): 21.1 ML/M^2
BH CV ECHO MEAS - SI(MOD-SP4): 24.1 ML/M^2
BH CV ECHO MEAS - SI(TEICH): 8.9 ML/M^2
BH CV ECHO MEAS - SV(AO): 307.5 ML
BH CV ECHO MEAS - SV(CUBED): 15.1 ML
BH CV ECHO MEAS - SV(LVOT): 95.2 ML
BH CV ECHO MEAS - SV(MOD-SP2): 42 ML
BH CV ECHO MEAS - SV(MOD-SP4): 48 ML
BH CV ECHO MEAS - SV(RVOT): 61.3 ML
BH CV ECHO MEAS - SV(TEICH): 17.8 ML
BH CV ECHO MEAS - TAPSE (>1.6): 2 CM2
BH CV ECHO MEAS - TR MAX VEL: 290 CM/SEC
BH CV ECHO MEASUREMENTS AVERAGE E/E' RATIO: 11.2
BH CV VAS BP RIGHT ARM: NORMAL MMHG
BH CV XLRA - RV BASE: 2.7 CM
BH CV XLRA - TDI S': 13 CM/SEC
BILIRUB UR QL STRIP: NEGATIVE
BUN BLD-MCNC: 24 MG/DL (ref 8–23)
BUN/CREAT SERPL: 31.2 (ref 7–25)
CALCIUM SPEC-SCNC: 8.7 MG/DL (ref 8.6–10.5)
CARBAMAZEPINE SERPL-MCNC: 10 MCG/ML (ref 4–12)
CHLORIDE SERPL-SCNC: 106 MMOL/L (ref 98–107)
CLARITY UR: CLEAR
CO2 SERPL-SCNC: 26.4 MMOL/L (ref 22–29)
COLOR UR: YELLOW
CREAT BLD-MCNC: 0.77 MG/DL (ref 0.57–1)
CRP SERPL-MCNC: 4.6 MG/DL (ref 0–0.5)
CRP SERPL-MCNC: 5.63 MG/DL (ref 0–0.5)
DEPRECATED RDW RBC AUTO: 45.8 FL (ref 37–54)
EOSINOPHIL # BLD AUTO: 0.24 10*3/MM3 (ref 0–0.7)
EOSINOPHIL NFR BLD AUTO: 4 % (ref 0.3–6.2)
ERYTHROCYTE [DISTWIDTH] IN BLOOD BY AUTOMATED COUNT: 12.7 % (ref 11.7–13)
GFR SERPL CREATININE-BSD FRML MDRD: 76 ML/MIN/1.73
GLUCOSE BLD-MCNC: 103 MG/DL (ref 65–99)
GLUCOSE BLDC GLUCOMTR-MCNC: 105 MG/DL (ref 70–130)
GLUCOSE BLDC GLUCOMTR-MCNC: 121 MG/DL (ref 70–130)
GLUCOSE BLDC GLUCOMTR-MCNC: 136 MG/DL (ref 70–130)
GLUCOSE BLDC GLUCOMTR-MCNC: 182 MG/DL (ref 70–130)
GLUCOSE UR STRIP-MCNC: NEGATIVE MG/DL
HBA1C MFR BLD: 5.9 % (ref 4.8–5.6)
HCT VFR BLD AUTO: 34.2 % (ref 35.6–45.5)
HGB BLD-MCNC: 11 G/DL (ref 11.9–15.5)
HGB UR QL STRIP.AUTO: NEGATIVE
HOLD SPECIMEN: NORMAL
HOLD SPECIMEN: NORMAL
HYALINE CASTS UR QL AUTO: ABNORMAL /LPF
IMM GRANULOCYTES # BLD: 0.03 10*3/MM3 (ref 0–0.03)
IMM GRANULOCYTES NFR BLD: 0.5 % (ref 0–0.5)
KETONES UR QL STRIP: NEGATIVE
LEFT ATRIUM VOLUME INDEX: 26 ML/M2
LEUKOCYTE ESTERASE UR QL STRIP.AUTO: ABNORMAL
LYMPHOCYTES # BLD AUTO: 1.47 10*3/MM3 (ref 0.9–4.8)
LYMPHOCYTES NFR BLD AUTO: 24.5 % (ref 19.6–45.3)
MAXIMAL PREDICTED HEART RATE: 159 BPM
MCH RBC QN AUTO: 32.3 PG (ref 26.9–32)
MCHC RBC AUTO-ENTMCNC: 32.2 G/DL (ref 32.4–36.3)
MCV RBC AUTO: 100.3 FL (ref 80.5–98.2)
MONOCYTES # BLD AUTO: 0.93 10*3/MM3 (ref 0.2–1.2)
MONOCYTES NFR BLD AUTO: 15.5 % (ref 5–12)
NEUTROPHILS # BLD AUTO: 3.31 10*3/MM3 (ref 1.9–8.1)
NEUTROPHILS NFR BLD AUTO: 55.2 % (ref 42.7–76)
NITRITE UR QL STRIP: NEGATIVE
PH UR STRIP.AUTO: 6 [PH] (ref 5–8)
PLATELET # BLD AUTO: 236 10*3/MM3 (ref 140–500)
PMV BLD AUTO: 9.8 FL (ref 6–12)
POTASSIUM BLD-SCNC: 3.3 MMOL/L (ref 3.5–5.2)
PROT UR QL STRIP: NEGATIVE
RBC # BLD AUTO: 3.41 10*6/MM3 (ref 3.9–5.2)
RBC # UR: ABNORMAL /HPF
REF LAB TEST METHOD: ABNORMAL
SODIUM BLD-SCNC: 143 MMOL/L (ref 136–145)
SP GR UR STRIP: 1.02 (ref 1–1.03)
SQUAMOUS #/AREA URNS HPF: ABNORMAL /HPF
STRESS TARGET HR: 135 BPM
URATE SERPL-MCNC: 6.5 MG/DL (ref 2.4–5.7)
UROBILINOGEN UR QL STRIP: ABNORMAL
WBC NRBC COR # BLD: 6 10*3/MM3 (ref 4.5–10.7)
WBC UR QL AUTO: ABNORMAL /HPF
WHOLE BLOOD HOLD SPECIMEN: NORMAL
WHOLE BLOOD HOLD SPECIMEN: NORMAL

## 2018-09-20 PROCEDURE — 25010000002 PIPERACILLIN SOD-TAZOBACTAM PER 1 G: Performed by: INTERNAL MEDICINE

## 2018-09-20 PROCEDURE — 25010000002 MORPHINE PER 10 MG: Performed by: INTERNAL MEDICINE

## 2018-09-20 PROCEDURE — 25010000002 VANCOMYCIN 10 G RECONSTITUTED SOLUTION: Performed by: INTERNAL MEDICINE

## 2018-09-20 PROCEDURE — 86140 C-REACTIVE PROTEIN: CPT | Performed by: INTERNAL MEDICINE

## 2018-09-20 PROCEDURE — 80048 BASIC METABOLIC PNL TOTAL CA: CPT | Performed by: INTERNAL MEDICINE

## 2018-09-20 PROCEDURE — 25010000002 VANCOMYCIN PER 500 MG: Performed by: INTERNAL MEDICINE

## 2018-09-20 PROCEDURE — 25010000002 ENOXAPARIN PER 10 MG: Performed by: INTERNAL MEDICINE

## 2018-09-20 PROCEDURE — 82962 GLUCOSE BLOOD TEST: CPT

## 2018-09-20 PROCEDURE — 87081 CULTURE SCREEN ONLY: CPT | Performed by: INTERNAL MEDICINE

## 2018-09-20 PROCEDURE — 0JBQ0ZZ EXCISION OF RIGHT FOOT SUBCUTANEOUS TISSUE AND FASCIA, OPEN APPROACH: ICD-10-PCS | Performed by: SURGERY

## 2018-09-20 PROCEDURE — 87040 BLOOD CULTURE FOR BACTERIA: CPT | Performed by: INTERNAL MEDICINE

## 2018-09-20 PROCEDURE — 99222 1ST HOSP IP/OBS MODERATE 55: CPT | Performed by: INTERNAL MEDICINE

## 2018-09-20 PROCEDURE — 82306 VITAMIN D 25 HYDROXY: CPT | Performed by: INTERNAL MEDICINE

## 2018-09-20 PROCEDURE — 0 TECHNETIUM SESTAMIBI: Performed by: INTERNAL MEDICINE

## 2018-09-20 PROCEDURE — 63710000001 INSULIN ASPART PER 5 UNITS: Performed by: INTERNAL MEDICINE

## 2018-09-20 PROCEDURE — 83036 HEMOGLOBIN GLYCOSYLATED A1C: CPT | Performed by: INTERNAL MEDICINE

## 2018-09-20 PROCEDURE — 93306 TTE W/DOPPLER COMPLETE: CPT | Performed by: INTERNAL MEDICINE

## 2018-09-20 PROCEDURE — 85025 COMPLETE CBC W/AUTO DIFF WBC: CPT | Performed by: INTERNAL MEDICINE

## 2018-09-20 PROCEDURE — A9500 TC99M SESTAMIBI: HCPCS | Performed by: INTERNAL MEDICINE

## 2018-09-20 PROCEDURE — 25010000002 PIPERACILLIN SOD-TAZOBACTAM PER 1 G: Performed by: EMERGENCY MEDICINE

## 2018-09-20 PROCEDURE — 93010 ELECTROCARDIOGRAM REPORT: CPT | Performed by: INTERNAL MEDICINE

## 2018-09-20 PROCEDURE — 81001 URINALYSIS AUTO W/SCOPE: CPT | Performed by: INTERNAL MEDICINE

## 2018-09-20 PROCEDURE — 84550 ASSAY OF BLOOD/URIC ACID: CPT | Performed by: INTERNAL MEDICINE

## 2018-09-20 PROCEDURE — 93306 TTE W/DOPPLER COMPLETE: CPT

## 2018-09-20 PROCEDURE — 93005 ELECTROCARDIOGRAM TRACING: CPT | Performed by: NURSE PRACTITIONER

## 2018-09-20 PROCEDURE — 99221 1ST HOSP IP/OBS SF/LOW 40: CPT | Performed by: INTERNAL MEDICINE

## 2018-09-20 RX ORDER — TRAMADOL HYDROCHLORIDE 50 MG/1
50 TABLET ORAL EVERY 6 HOURS PRN
Status: DISCONTINUED | OUTPATIENT
Start: 2018-09-20 | End: 2018-09-28 | Stop reason: HOSPADM

## 2018-09-20 RX ORDER — PANTOPRAZOLE SODIUM 40 MG/1
40 TABLET, DELAYED RELEASE ORAL EVERY MORNING
Status: DISCONTINUED | OUTPATIENT
Start: 2018-09-20 | End: 2018-09-28 | Stop reason: HOSPADM

## 2018-09-20 RX ORDER — PROMETHAZINE HYDROCHLORIDE 25 MG/1
25 TABLET ORAL EVERY 4 HOURS PRN
Status: DISCONTINUED | OUTPATIENT
Start: 2018-09-20 | End: 2018-09-28 | Stop reason: HOSPADM

## 2018-09-20 RX ORDER — MUSCLE RUB CREAM 100; 150 MG/G; MG/G
CREAM TOPICAL
Status: DISCONTINUED | OUTPATIENT
Start: 2018-09-20 | End: 2018-09-28 | Stop reason: HOSPADM

## 2018-09-20 RX ORDER — SODIUM CHLORIDE 0.9 % (FLUSH) 0.9 %
1-10 SYRINGE (ML) INJECTION AS NEEDED
Status: DISCONTINUED | OUTPATIENT
Start: 2018-09-20 | End: 2018-09-28 | Stop reason: HOSPADM

## 2018-09-20 RX ORDER — SODIUM CHLORIDE 450 MG/100ML
75 INJECTION, SOLUTION INTRAVENOUS CONTINUOUS
Status: DISCONTINUED | OUTPATIENT
Start: 2018-09-20 | End: 2018-09-28 | Stop reason: HOSPADM

## 2018-09-20 RX ORDER — CARBAMAZEPINE 200 MG/1
200 TABLET ORAL 2 TIMES DAILY
Status: DISCONTINUED | OUTPATIENT
Start: 2018-09-20 | End: 2018-09-28 | Stop reason: HOSPADM

## 2018-09-20 RX ORDER — NITROGLYCERIN 0.4 MG/1
0.4 TABLET SUBLINGUAL
Status: DISCONTINUED | OUTPATIENT
Start: 2018-09-20 | End: 2018-09-24

## 2018-09-20 RX ORDER — NALOXONE HCL 0.4 MG/ML
0.4 VIAL (ML) INJECTION
Status: DISCONTINUED | OUTPATIENT
Start: 2018-09-20 | End: 2018-09-28 | Stop reason: HOSPADM

## 2018-09-20 RX ORDER — CLONAZEPAM 1 MG/1
1 TABLET ORAL 4 TIMES DAILY PRN
Status: DISCONTINUED | OUTPATIENT
Start: 2018-09-20 | End: 2018-09-28 | Stop reason: HOSPADM

## 2018-09-20 RX ORDER — OLANZAPINE 2.5 MG/1
2.5 TABLET ORAL NIGHTLY
Status: DISCONTINUED | OUTPATIENT
Start: 2018-09-20 | End: 2018-09-28 | Stop reason: HOSPADM

## 2018-09-20 RX ORDER — ACETAMINOPHEN 325 MG/1
650 TABLET ORAL EVERY 4 HOURS PRN
Status: DISCONTINUED | OUTPATIENT
Start: 2018-09-20 | End: 2018-09-21 | Stop reason: SDUPTHER

## 2018-09-20 RX ORDER — HYDROCODONE BITARTRATE AND ACETAMINOPHEN 10; 325 MG/1; MG/1
1 TABLET ORAL EVERY 4 HOURS PRN
Status: DISCONTINUED | OUTPATIENT
Start: 2018-09-20 | End: 2018-09-25

## 2018-09-20 RX ORDER — NICOTINE POLACRILEX 4 MG
15 LOZENGE BUCCAL
Status: DISCONTINUED | OUTPATIENT
Start: 2018-09-20 | End: 2018-09-27

## 2018-09-20 RX ORDER — CYCLOBENZAPRINE HCL 10 MG
10 TABLET ORAL 3 TIMES DAILY PRN
Status: DISCONTINUED | OUTPATIENT
Start: 2018-09-20 | End: 2018-09-28 | Stop reason: HOSPADM

## 2018-09-20 RX ORDER — CALCIUM CARBONATE 200(500)MG
2 TABLET,CHEWABLE ORAL 3 TIMES DAILY PRN
Status: DISCONTINUED | OUTPATIENT
Start: 2018-09-20 | End: 2018-09-28 | Stop reason: HOSPADM

## 2018-09-20 RX ORDER — HYDROCODONE BITARTRATE AND ACETAMINOPHEN 5; 325 MG/1; MG/1
1 TABLET ORAL EVERY 4 HOURS PRN
Status: DISCONTINUED | OUTPATIENT
Start: 2018-09-20 | End: 2018-09-28 | Stop reason: HOSPADM

## 2018-09-20 RX ORDER — VANCOMYCIN HYDROCHLORIDE 1 G/200ML
1000 INJECTION, SOLUTION INTRAVENOUS EVERY 12 HOURS
Status: DISCONTINUED | OUTPATIENT
Start: 2018-09-20 | End: 2018-09-20

## 2018-09-20 RX ORDER — FLUOXETINE HYDROCHLORIDE 20 MG/1
40 CAPSULE ORAL DAILY
Status: DISCONTINUED | OUTPATIENT
Start: 2018-09-20 | End: 2018-09-28 | Stop reason: HOSPADM

## 2018-09-20 RX ORDER — AMLODIPINE BESYLATE 2.5 MG/1
2.5 TABLET ORAL
Status: DISCONTINUED | OUTPATIENT
Start: 2018-09-20 | End: 2018-09-20

## 2018-09-20 RX ORDER — CLOPIDOGREL BISULFATE 75 MG/1
75 TABLET ORAL DAILY
Status: DISCONTINUED | OUTPATIENT
Start: 2018-09-20 | End: 2018-09-28 | Stop reason: HOSPADM

## 2018-09-20 RX ORDER — ACETAMINOPHEN 325 MG/1
650 TABLET ORAL EVERY 4 HOURS PRN
Status: DISCONTINUED | OUTPATIENT
Start: 2018-09-20 | End: 2018-09-28 | Stop reason: HOSPADM

## 2018-09-20 RX ORDER — DOCUSATE SODIUM 100 MG/1
100 CAPSULE, LIQUID FILLED ORAL 2 TIMES DAILY
Status: DISCONTINUED | OUTPATIENT
Start: 2018-09-20 | End: 2018-09-28 | Stop reason: HOSPADM

## 2018-09-20 RX ORDER — CLONAZEPAM 1 MG/1
1 TABLET ORAL 3 TIMES DAILY PRN
Status: DISCONTINUED | OUTPATIENT
Start: 2018-09-20 | End: 2018-09-20

## 2018-09-20 RX ORDER — ZOLPIDEM TARTRATE 5 MG/1
10 TABLET ORAL NIGHTLY PRN
Status: DISCONTINUED | OUTPATIENT
Start: 2018-09-20 | End: 2018-09-28 | Stop reason: HOSPADM

## 2018-09-20 RX ORDER — ONDANSETRON 2 MG/ML
4 INJECTION INTRAMUSCULAR; INTRAVENOUS EVERY 6 HOURS PRN
Status: DISCONTINUED | OUTPATIENT
Start: 2018-09-20 | End: 2018-09-28 | Stop reason: HOSPADM

## 2018-09-20 RX ORDER — DOCUSATE SODIUM 100 MG/1
100 CAPSULE, LIQUID FILLED ORAL 3 TIMES DAILY PRN
Status: DISCONTINUED | OUTPATIENT
Start: 2018-09-20 | End: 2018-09-28 | Stop reason: HOSPADM

## 2018-09-20 RX ORDER — MORPHINE SULFATE 2 MG/ML
1 INJECTION, SOLUTION INTRAMUSCULAR; INTRAVENOUS EVERY 4 HOURS PRN
Status: DISCONTINUED | OUTPATIENT
Start: 2018-09-20 | End: 2018-09-28 | Stop reason: HOSPADM

## 2018-09-20 RX ORDER — OMEGA-3S/DHA/EPA/FISH OIL/D3 300MG-1000
400 CAPSULE ORAL DAILY
Status: DISCONTINUED | OUTPATIENT
Start: 2018-09-20 | End: 2018-09-28 | Stop reason: HOSPADM

## 2018-09-20 RX ORDER — DEXTROSE MONOHYDRATE 25 G/50ML
25 INJECTION, SOLUTION INTRAVENOUS
Status: DISCONTINUED | OUTPATIENT
Start: 2018-09-20 | End: 2018-09-27

## 2018-09-20 RX ADMIN — SODIUM CHLORIDE 75 ML/HR: 4.5 INJECTION, SOLUTION INTRAVENOUS at 16:44

## 2018-09-20 RX ADMIN — TAZOBACTAM SODIUM AND PIPERACILLIN SODIUM 3.38 G: 375; 3 INJECTION, SOLUTION INTRAVENOUS at 06:17

## 2018-09-20 RX ADMIN — DOCUSATE SODIUM 100 MG: 100 CAPSULE, LIQUID FILLED ORAL at 20:50

## 2018-09-20 RX ADMIN — TAZOBACTAM SODIUM AND PIPERACILLIN SODIUM 4.5 G: 500; 4 INJECTION, SOLUTION INTRAVENOUS at 01:04

## 2018-09-20 RX ADMIN — METOPROLOL TARTRATE 12.5 MG: 25 TABLET ORAL at 20:50

## 2018-09-20 RX ADMIN — CLONAZEPAM 1 MG: 1 TABLET ORAL at 19:42

## 2018-09-20 RX ADMIN — PANTOPRAZOLE SODIUM 40 MG: 40 TABLET, DELAYED RELEASE ORAL at 06:17

## 2018-09-20 RX ADMIN — CARBAMAZEPINE 200 MG: 200 TABLET ORAL at 10:30

## 2018-09-20 RX ADMIN — DOCUSATE SODIUM 100 MG: 100 CAPSULE, LIQUID FILLED ORAL at 10:30

## 2018-09-20 RX ADMIN — HYDROCODONE BITARTRATE AND ACETAMINOPHEN 1 TABLET: 5; 325 TABLET ORAL at 22:02

## 2018-09-20 RX ADMIN — TAZOBACTAM SODIUM AND PIPERACILLIN SODIUM 3.38 G: 375; 3 INJECTION, SOLUTION INTRAVENOUS at 23:45

## 2018-09-20 RX ADMIN — CLOPIDOGREL 75 MG: 75 TABLET, FILM COATED ORAL at 10:30

## 2018-09-20 RX ADMIN — HYDROCODONE BITARTRATE AND ACETAMINOPHEN 1 TABLET: 5; 325 TABLET ORAL at 18:08

## 2018-09-20 RX ADMIN — HYDROCODONE BITARTRATE AND ACETAMINOPHEN 1 TABLET: 10; 325 TABLET ORAL at 06:17

## 2018-09-20 RX ADMIN — FLUOXETINE HYDROCHLORIDE 40 MG: 20 CAPSULE ORAL at 10:30

## 2018-09-20 RX ADMIN — MORPHINE SULFATE 1 MG: 2 INJECTION, SOLUTION INTRAMUSCULAR; INTRAVENOUS at 22:40

## 2018-09-20 RX ADMIN — CLONAZEPAM 1 MG: 1 TABLET ORAL at 11:11

## 2018-09-20 RX ADMIN — SODIUM CHLORIDE 75 ML/HR: 4.5 INJECTION, SOLUTION INTRAVENOUS at 05:07

## 2018-09-20 RX ADMIN — ENOXAPARIN SODIUM 40 MG: 40 INJECTION SUBCUTANEOUS at 06:17

## 2018-09-20 RX ADMIN — VANCOMYCIN HYDROCHLORIDE 1000 MG: 1 INJECTION, SOLUTION INTRAVENOUS at 05:07

## 2018-09-20 RX ADMIN — INSULIN ASPART 2 UNITS: 100 INJECTION, SOLUTION INTRAVENOUS; SUBCUTANEOUS at 12:37

## 2018-09-20 RX ADMIN — OLANZAPINE 2.5 MG: 2.5 TABLET, FILM COATED ORAL at 20:50

## 2018-09-20 RX ADMIN — TAZOBACTAM SODIUM AND PIPERACILLIN SODIUM 3.38 G: 375; 3 INJECTION, SOLUTION INTRAVENOUS at 16:47

## 2018-09-20 RX ADMIN — CARBAMAZEPINE 200 MG: 200 TABLET ORAL at 20:50

## 2018-09-20 RX ADMIN — CHOLECALCIFEROL TAB 10 MCG (400 UNIT) 400 UNITS: 10 TAB at 10:30

## 2018-09-20 RX ADMIN — VANCOMYCIN HYDROCHLORIDE 1500 MG: 10 INJECTION, POWDER, LYOPHILIZED, FOR SOLUTION INTRAVENOUS at 17:06

## 2018-09-20 RX ADMIN — ZOLPIDEM TARTRATE 10 MG: 5 TABLET ORAL at 22:44

## 2018-09-20 RX ADMIN — TECHNETIUM TC 99M SESTAMIBI 1 DOSE: 1 INJECTION INTRAVENOUS at 11:53

## 2018-09-21 LAB
ANION GAP SERPL CALCULATED.3IONS-SCNC: 10.9 MMOL/L
BASOPHILS # BLD AUTO: 0.03 10*3/MM3 (ref 0–0.2)
BASOPHILS NFR BLD AUTO: 0.6 % (ref 0–1.5)
BUN BLD-MCNC: 6 MG/DL (ref 8–23)
BUN/CREAT SERPL: 8.6 (ref 7–25)
CALCIUM SPEC-SCNC: 8.2 MG/DL (ref 8.6–10.5)
CHLORIDE SERPL-SCNC: 109 MMOL/L (ref 98–107)
CO2 SERPL-SCNC: 23.1 MMOL/L (ref 22–29)
CREAT BLD-MCNC: 0.7 MG/DL (ref 0.57–1)
CRP SERPL-MCNC: 3.29 MG/DL (ref 0–0.5)
DEPRECATED RDW RBC AUTO: 46.5 FL (ref 37–54)
EOSINOPHIL # BLD AUTO: 0.3 10*3/MM3 (ref 0–0.7)
EOSINOPHIL NFR BLD AUTO: 5.8 % (ref 0.3–6.2)
ERYTHROCYTE [DISTWIDTH] IN BLOOD BY AUTOMATED COUNT: 12.9 % (ref 11.7–13)
ERYTHROCYTE [SEDIMENTATION RATE] IN BLOOD: 43 MM/HR (ref 0–30)
FERRITIN SERPL-MCNC: 59.03 NG/ML (ref 13–150)
FOLATE SERPL-MCNC: 14.2 NG/ML (ref 4.78–24.2)
GFR SERPL CREATININE-BSD FRML MDRD: 85 ML/MIN/1.73
GLUCOSE BLD-MCNC: 106 MG/DL (ref 65–99)
GLUCOSE BLDC GLUCOMTR-MCNC: 106 MG/DL (ref 70–130)
GLUCOSE BLDC GLUCOMTR-MCNC: 115 MG/DL (ref 70–130)
GLUCOSE BLDC GLUCOMTR-MCNC: 129 MG/DL (ref 70–130)
GLUCOSE BLDC GLUCOMTR-MCNC: 147 MG/DL (ref 70–130)
HCT VFR BLD AUTO: 31 % (ref 35.6–45.5)
HEMOCCULT STL QL: NEGATIVE
HGB BLD-MCNC: 10.1 G/DL (ref 11.9–15.5)
IMM GRANULOCYTES # BLD: 0.03 10*3/MM3 (ref 0–0.03)
IMM GRANULOCYTES NFR BLD: 0.6 % (ref 0–0.5)
IRON 24H UR-MRATE: 30 MCG/DL (ref 37–145)
IRON SATN MFR SERPL: 9 % (ref 20–50)
LYMPHOCYTES # BLD AUTO: 1.55 10*3/MM3 (ref 0.9–4.8)
LYMPHOCYTES NFR BLD AUTO: 29.8 % (ref 19.6–45.3)
MAGNESIUM SERPL-MCNC: 2 MG/DL (ref 1.6–2.4)
MCH RBC QN AUTO: 32.7 PG (ref 26.9–32)
MCHC RBC AUTO-ENTMCNC: 32.6 G/DL (ref 32.4–36.3)
MCV RBC AUTO: 100.3 FL (ref 80.5–98.2)
MONOCYTES # BLD AUTO: 0.85 10*3/MM3 (ref 0.2–1.2)
MONOCYTES NFR BLD AUTO: 16.3 % (ref 5–12)
NEUTROPHILS # BLD AUTO: 2.47 10*3/MM3 (ref 1.9–8.1)
NEUTROPHILS NFR BLD AUTO: 47.5 % (ref 42.7–76)
PLATELET # BLD AUTO: 225 10*3/MM3 (ref 140–500)
PMV BLD AUTO: 9.4 FL (ref 6–12)
POTASSIUM BLD-SCNC: 3.5 MMOL/L (ref 3.5–5.2)
RBC # BLD AUTO: 3.09 10*6/MM3 (ref 3.9–5.2)
SODIUM BLD-SCNC: 143 MMOL/L (ref 136–145)
TIBC SERPL-MCNC: 320 MCG/DL (ref 298–536)
TRANSFERRIN SERPL-MCNC: 215 MG/DL (ref 200–360)
WBC NRBC COR # BLD: 5.2 10*3/MM3 (ref 4.5–10.7)

## 2018-09-21 PROCEDURE — 86140 C-REACTIVE PROTEIN: CPT | Performed by: INTERNAL MEDICINE

## 2018-09-21 PROCEDURE — 85652 RBC SED RATE AUTOMATED: CPT | Performed by: INTERNAL MEDICINE

## 2018-09-21 PROCEDURE — 80048 BASIC METABOLIC PNL TOTAL CA: CPT | Performed by: INTERNAL MEDICINE

## 2018-09-21 PROCEDURE — 82962 GLUCOSE BLOOD TEST: CPT

## 2018-09-21 PROCEDURE — 85025 COMPLETE CBC W/AUTO DIFF WBC: CPT | Performed by: INTERNAL MEDICINE

## 2018-09-21 PROCEDURE — 25010000002 PIPERACILLIN SOD-TAZOBACTAM PER 1 G: Performed by: INTERNAL MEDICINE

## 2018-09-21 PROCEDURE — 83735 ASSAY OF MAGNESIUM: CPT | Performed by: INTERNAL MEDICINE

## 2018-09-21 PROCEDURE — 99232 SBSQ HOSP IP/OBS MODERATE 35: CPT | Performed by: INTERNAL MEDICINE

## 2018-09-21 PROCEDURE — 82272 OCCULT BLD FECES 1-3 TESTS: CPT | Performed by: NURSE PRACTITIONER

## 2018-09-21 PROCEDURE — C1751 CATH, INF, PER/CENT/MIDLINE: HCPCS

## 2018-09-21 PROCEDURE — 25010000002 ENOXAPARIN PER 10 MG: Performed by: INTERNAL MEDICINE

## 2018-09-21 PROCEDURE — 02HV33Z INSERTION OF INFUSION DEVICE INTO SUPERIOR VENA CAVA, PERCUTANEOUS APPROACH: ICD-10-PCS | Performed by: INTERNAL MEDICINE

## 2018-09-21 PROCEDURE — 82728 ASSAY OF FERRITIN: CPT | Performed by: INTERNAL MEDICINE

## 2018-09-21 PROCEDURE — 83540 ASSAY OF IRON: CPT | Performed by: INTERNAL MEDICINE

## 2018-09-21 PROCEDURE — 84466 ASSAY OF TRANSFERRIN: CPT | Performed by: INTERNAL MEDICINE

## 2018-09-21 PROCEDURE — 82746 ASSAY OF FOLIC ACID SERUM: CPT | Performed by: INTERNAL MEDICINE

## 2018-09-21 RX ORDER — SODIUM CHLORIDE 0.9 % (FLUSH) 0.9 %
10 SYRINGE (ML) INJECTION AS NEEDED
Status: DISCONTINUED | OUTPATIENT
Start: 2018-09-21 | End: 2018-09-28 | Stop reason: HOSPADM

## 2018-09-21 RX ORDER — SODIUM CHLORIDE 0.9 % (FLUSH) 0.9 %
20 SYRINGE (ML) INJECTION AS NEEDED
Status: DISCONTINUED | OUTPATIENT
Start: 2018-09-21 | End: 2018-09-28 | Stop reason: HOSPADM

## 2018-09-21 RX ORDER — UREA 10 %
140 LOTION (ML) TOPICAL
Status: DISCONTINUED | OUTPATIENT
Start: 2018-09-22 | End: 2018-09-28 | Stop reason: HOSPADM

## 2018-09-21 RX ADMIN — METOPROLOL TARTRATE 12.5 MG: 25 TABLET ORAL at 09:09

## 2018-09-21 RX ADMIN — CLONAZEPAM 1 MG: 1 TABLET ORAL at 10:16

## 2018-09-21 RX ADMIN — CARBAMAZEPINE 200 MG: 200 TABLET ORAL at 20:16

## 2018-09-21 RX ADMIN — CHOLECALCIFEROL TAB 10 MCG (400 UNIT) 400 UNITS: 10 TAB at 09:09

## 2018-09-21 RX ADMIN — DOCUSATE SODIUM 100 MG: 100 CAPSULE, LIQUID FILLED ORAL at 09:09

## 2018-09-21 RX ADMIN — PANTOPRAZOLE SODIUM 40 MG: 40 TABLET, DELAYED RELEASE ORAL at 06:15

## 2018-09-21 RX ADMIN — ENOXAPARIN SODIUM 40 MG: 40 INJECTION SUBCUTANEOUS at 06:15

## 2018-09-21 RX ADMIN — TAZOBACTAM SODIUM AND PIPERACILLIN SODIUM 3.38 G: 375; 3 INJECTION, SOLUTION INTRAVENOUS at 22:30

## 2018-09-21 RX ADMIN — ZOLPIDEM TARTRATE 10 MG: 5 TABLET ORAL at 22:14

## 2018-09-21 RX ADMIN — FLUOXETINE HYDROCHLORIDE 40 MG: 20 CAPSULE ORAL at 09:12

## 2018-09-21 RX ADMIN — METOPROLOL TARTRATE 12.5 MG: 25 TABLET ORAL at 20:17

## 2018-09-21 RX ADMIN — CARBAMAZEPINE 200 MG: 200 TABLET ORAL at 09:09

## 2018-09-21 RX ADMIN — CLONAZEPAM 1 MG: 1 TABLET ORAL at 21:01

## 2018-09-21 RX ADMIN — OLANZAPINE 2.5 MG: 2.5 TABLET, FILM COATED ORAL at 20:17

## 2018-09-21 RX ADMIN — HYDROCODONE BITARTRATE AND ACETAMINOPHEN 1 TABLET: 10; 325 TABLET ORAL at 10:16

## 2018-09-21 RX ADMIN — CLONAZEPAM 1 MG: 1 TABLET ORAL at 01:59

## 2018-09-21 RX ADMIN — SODIUM CHLORIDE 75 ML/HR: 4.5 INJECTION, SOLUTION INTRAVENOUS at 22:03

## 2018-09-21 RX ADMIN — HYDROCODONE BITARTRATE AND ACETAMINOPHEN 1 TABLET: 10; 325 TABLET ORAL at 01:59

## 2018-09-21 RX ADMIN — HYDROCODONE BITARTRATE AND ACETAMINOPHEN 1 TABLET: 10; 325 TABLET ORAL at 06:15

## 2018-09-21 RX ADMIN — HYDROCODONE BITARTRATE AND ACETAMINOPHEN 1 TABLET: 10; 325 TABLET ORAL at 15:44

## 2018-09-21 RX ADMIN — CLOPIDOGREL 75 MG: 75 TABLET, FILM COATED ORAL at 09:09

## 2018-09-22 ENCOUNTER — APPOINTMENT (OUTPATIENT)
Dept: NUCLEAR MEDICINE | Facility: HOSPITAL | Age: 63
End: 2018-09-22

## 2018-09-22 LAB
ANION GAP SERPL CALCULATED.3IONS-SCNC: 7.5 MMOL/L
BASOPHILS # BLD AUTO: 0.03 10*3/MM3 (ref 0–0.2)
BASOPHILS NFR BLD AUTO: 0.6 % (ref 0–1.5)
BUN BLD-MCNC: 11 MG/DL (ref 8–23)
BUN/CREAT SERPL: 17.7 (ref 7–25)
CALCIUM SPEC-SCNC: 8.2 MG/DL (ref 8.6–10.5)
CHLORIDE SERPL-SCNC: 110 MMOL/L (ref 98–107)
CO2 SERPL-SCNC: 25.5 MMOL/L (ref 22–29)
CREAT BLD-MCNC: 0.62 MG/DL (ref 0.57–1)
DEPRECATED RDW RBC AUTO: 45.8 FL (ref 37–54)
EOSINOPHIL # BLD AUTO: 0.23 10*3/MM3 (ref 0–0.7)
EOSINOPHIL NFR BLD AUTO: 4.8 % (ref 0.3–6.2)
ERYTHROCYTE [DISTWIDTH] IN BLOOD BY AUTOMATED COUNT: 12.6 % (ref 11.7–13)
GFR SERPL CREATININE-BSD FRML MDRD: 98 ML/MIN/1.73
GLUCOSE BLD-MCNC: 90 MG/DL (ref 65–99)
GLUCOSE BLDC GLUCOMTR-MCNC: 101 MG/DL (ref 70–130)
GLUCOSE BLDC GLUCOMTR-MCNC: 107 MG/DL (ref 70–130)
GLUCOSE BLDC GLUCOMTR-MCNC: 114 MG/DL (ref 70–130)
GLUCOSE BLDC GLUCOMTR-MCNC: 153 MG/DL (ref 70–130)
HCT VFR BLD AUTO: 32.9 % (ref 35.6–45.5)
HGB BLD-MCNC: 10.3 G/DL (ref 11.9–15.5)
IMM GRANULOCYTES # BLD: 0.05 10*3/MM3 (ref 0–0.03)
IMM GRANULOCYTES NFR BLD: 1.1 % (ref 0–0.5)
LYMPHOCYTES # BLD AUTO: 1.46 10*3/MM3 (ref 0.9–4.8)
LYMPHOCYTES NFR BLD AUTO: 30.7 % (ref 19.6–45.3)
MCH RBC QN AUTO: 31.9 PG (ref 26.9–32)
MCHC RBC AUTO-ENTMCNC: 31.3 G/DL (ref 32.4–36.3)
MCV RBC AUTO: 101.9 FL (ref 80.5–98.2)
MONOCYTES # BLD AUTO: 0.64 10*3/MM3 (ref 0.2–1.2)
MONOCYTES NFR BLD AUTO: 13.5 % (ref 5–12)
MRSA SPEC QL CULT: NORMAL
NEUTROPHILS # BLD AUTO: 2.34 10*3/MM3 (ref 1.9–8.1)
NEUTROPHILS NFR BLD AUTO: 49.3 % (ref 42.7–76)
PLATELET # BLD AUTO: 216 10*3/MM3 (ref 140–500)
PMV BLD AUTO: 9.7 FL (ref 6–12)
POTASSIUM BLD-SCNC: 4 MMOL/L (ref 3.5–5.2)
RBC # BLD AUTO: 3.23 10*6/MM3 (ref 3.9–5.2)
SODIUM BLD-SCNC: 143 MMOL/L (ref 136–145)
WBC NRBC COR # BLD: 4.75 10*3/MM3 (ref 4.5–10.7)

## 2018-09-22 PROCEDURE — 25010000002 PIPERACILLIN SOD-TAZOBACTAM PER 1 G: Performed by: INTERNAL MEDICINE

## 2018-09-22 PROCEDURE — 25010000002 ENOXAPARIN PER 10 MG: Performed by: INTERNAL MEDICINE

## 2018-09-22 PROCEDURE — 25010000002 VANCOMYCIN 10 G RECONSTITUTED SOLUTION: Performed by: INTERNAL MEDICINE

## 2018-09-22 PROCEDURE — 85025 COMPLETE CBC W/AUTO DIFF WBC: CPT | Performed by: INTERNAL MEDICINE

## 2018-09-22 PROCEDURE — 80048 BASIC METABOLIC PNL TOTAL CA: CPT | Performed by: INTERNAL MEDICINE

## 2018-09-22 PROCEDURE — A9503 TC99M MEDRONATE: HCPCS | Performed by: INTERNAL MEDICINE

## 2018-09-22 PROCEDURE — 99231 SBSQ HOSP IP/OBS SF/LOW 25: CPT | Performed by: INTERNAL MEDICINE

## 2018-09-22 PROCEDURE — 78315 BONE IMAGING 3 PHASE: CPT

## 2018-09-22 PROCEDURE — 82962 GLUCOSE BLOOD TEST: CPT

## 2018-09-22 PROCEDURE — 0 TECHNETIUM MEDRONATE KIT: Performed by: INTERNAL MEDICINE

## 2018-09-22 RX ORDER — TC 99M MEDRONATE 20 MG/10ML
22.3 INJECTION, POWDER, LYOPHILIZED, FOR SOLUTION INTRAVENOUS
Status: COMPLETED | OUTPATIENT
Start: 2018-09-22 | End: 2018-09-22

## 2018-09-22 RX ADMIN — CLONAZEPAM 1 MG: 1 TABLET ORAL at 14:53

## 2018-09-22 RX ADMIN — CLONAZEPAM 1 MG: 1 TABLET ORAL at 09:13

## 2018-09-22 RX ADMIN — Medication 22.3 MILLICURIE: at 08:30

## 2018-09-22 RX ADMIN — ENOXAPARIN SODIUM 40 MG: 40 INJECTION SUBCUTANEOUS at 06:21

## 2018-09-22 RX ADMIN — Medication 140 MG: at 09:13

## 2018-09-22 RX ADMIN — TAZOBACTAM SODIUM AND PIPERACILLIN SODIUM 3.38 G: 375; 3 INJECTION, SOLUTION INTRAVENOUS at 23:27

## 2018-09-22 RX ADMIN — TAZOBACTAM SODIUM AND PIPERACILLIN SODIUM 3.38 G: 375; 3 INJECTION, SOLUTION INTRAVENOUS at 15:30

## 2018-09-22 RX ADMIN — DOCUSATE SODIUM 100 MG: 100 CAPSULE, LIQUID FILLED ORAL at 09:13

## 2018-09-22 RX ADMIN — TAZOBACTAM SODIUM AND PIPERACILLIN SODIUM 3.38 G: 375; 3 INJECTION, SOLUTION INTRAVENOUS at 09:14

## 2018-09-22 RX ADMIN — VANCOMYCIN HYDROCHLORIDE 1500 MG: 10 INJECTION, POWDER, LYOPHILIZED, FOR SOLUTION INTRAVENOUS at 17:54

## 2018-09-22 RX ADMIN — SODIUM CHLORIDE 75 ML/HR: 4.5 INJECTION, SOLUTION INTRAVENOUS at 09:16

## 2018-09-22 RX ADMIN — FLUOXETINE HYDROCHLORIDE 40 MG: 20 CAPSULE ORAL at 09:13

## 2018-09-22 RX ADMIN — OLANZAPINE 2.5 MG: 2.5 TABLET, FILM COATED ORAL at 20:57

## 2018-09-22 RX ADMIN — CHOLECALCIFEROL TAB 10 MCG (400 UNIT) 400 UNITS: 10 TAB at 09:13

## 2018-09-22 RX ADMIN — CLONAZEPAM 1 MG: 1 TABLET ORAL at 20:57

## 2018-09-22 RX ADMIN — HYDROCODONE BITARTRATE AND ACETAMINOPHEN 1 TABLET: 5; 325 TABLET ORAL at 14:52

## 2018-09-22 RX ADMIN — HYDROCODONE BITARTRATE AND ACETAMINOPHEN 1 TABLET: 10; 325 TABLET ORAL at 23:24

## 2018-09-22 RX ADMIN — CARBAMAZEPINE 200 MG: 200 TABLET ORAL at 09:12

## 2018-09-22 RX ADMIN — VANCOMYCIN HYDROCHLORIDE 1500 MG: 10 INJECTION, POWDER, LYOPHILIZED, FOR SOLUTION INTRAVENOUS at 03:51

## 2018-09-22 RX ADMIN — METOPROLOL TARTRATE 12.5 MG: 25 TABLET ORAL at 20:57

## 2018-09-22 RX ADMIN — HYDROCODONE BITARTRATE AND ACETAMINOPHEN 1 TABLET: 10; 325 TABLET ORAL at 03:50

## 2018-09-22 RX ADMIN — PANTOPRAZOLE SODIUM 40 MG: 40 TABLET, DELAYED RELEASE ORAL at 06:21

## 2018-09-22 RX ADMIN — METOPROLOL TARTRATE 12.5 MG: 25 TABLET ORAL at 09:13

## 2018-09-22 RX ADMIN — HYDROCODONE BITARTRATE AND ACETAMINOPHEN 1 TABLET: 10; 325 TABLET ORAL at 18:53

## 2018-09-22 RX ADMIN — CARBAMAZEPINE 200 MG: 200 TABLET ORAL at 20:57

## 2018-09-22 RX ADMIN — CLOPIDOGREL 75 MG: 75 TABLET, FILM COATED ORAL at 09:13

## 2018-09-22 RX ADMIN — HYDROCODONE BITARTRATE AND ACETAMINOPHEN 1 TABLET: 5; 325 TABLET ORAL at 09:13

## 2018-09-23 LAB
ANION GAP SERPL CALCULATED.3IONS-SCNC: 11.4 MMOL/L
BASOPHILS # BLD AUTO: 0.03 10*3/MM3 (ref 0–0.2)
BASOPHILS NFR BLD AUTO: 0.6 % (ref 0–1.5)
BUN BLD-MCNC: 9 MG/DL (ref 8–23)
BUN/CREAT SERPL: 11.1 (ref 7–25)
CALCIUM SPEC-SCNC: 8.1 MG/DL (ref 8.6–10.5)
CHLORIDE SERPL-SCNC: 111 MMOL/L (ref 98–107)
CO2 SERPL-SCNC: 23.6 MMOL/L (ref 22–29)
CREAT BLD-MCNC: 0.81 MG/DL (ref 0.57–1)
CRP SERPL-MCNC: 1.04 MG/DL (ref 0–0.5)
DEPRECATED RDW RBC AUTO: 45.6 FL (ref 37–54)
EOSINOPHIL # BLD AUTO: 0.28 10*3/MM3 (ref 0–0.7)
EOSINOPHIL NFR BLD AUTO: 5.7 % (ref 0.3–6.2)
ERYTHROCYTE [DISTWIDTH] IN BLOOD BY AUTOMATED COUNT: 12.5 % (ref 11.7–13)
ERYTHROCYTE [SEDIMENTATION RATE] IN BLOOD: 33 MM/HR (ref 0–30)
GFR SERPL CREATININE-BSD FRML MDRD: 72 ML/MIN/1.73
GLUCOSE BLD-MCNC: 84 MG/DL (ref 65–99)
GLUCOSE BLDC GLUCOMTR-MCNC: 100 MG/DL (ref 70–130)
GLUCOSE BLDC GLUCOMTR-MCNC: 73 MG/DL (ref 70–130)
GLUCOSE BLDC GLUCOMTR-MCNC: 84 MG/DL (ref 70–130)
GLUCOSE BLDC GLUCOMTR-MCNC: 84 MG/DL (ref 70–130)
HCT VFR BLD AUTO: 32.8 % (ref 35.6–45.5)
HGB BLD-MCNC: 10.4 G/DL (ref 11.9–15.5)
IMM GRANULOCYTES # BLD: 0.07 10*3/MM3 (ref 0–0.03)
IMM GRANULOCYTES NFR BLD: 1.4 % (ref 0–0.5)
LYMPHOCYTES # BLD AUTO: 1.74 10*3/MM3 (ref 0.9–4.8)
LYMPHOCYTES NFR BLD AUTO: 35.2 % (ref 19.6–45.3)
MCH RBC QN AUTO: 32.1 PG (ref 26.9–32)
MCHC RBC AUTO-ENTMCNC: 31.7 G/DL (ref 32.4–36.3)
MCV RBC AUTO: 101.2 FL (ref 80.5–98.2)
MONOCYTES # BLD AUTO: 0.73 10*3/MM3 (ref 0.2–1.2)
MONOCYTES NFR BLD AUTO: 14.8 % (ref 5–12)
NEUTROPHILS # BLD AUTO: 2.09 10*3/MM3 (ref 1.9–8.1)
NEUTROPHILS NFR BLD AUTO: 42.3 % (ref 42.7–76)
PLATELET # BLD AUTO: 210 10*3/MM3 (ref 140–500)
PMV BLD AUTO: 9.3 FL (ref 6–12)
POTASSIUM BLD-SCNC: 4.1 MMOL/L (ref 3.5–5.2)
RBC # BLD AUTO: 3.24 10*6/MM3 (ref 3.9–5.2)
SODIUM BLD-SCNC: 146 MMOL/L (ref 136–145)
VANCOMYCIN TROUGH SERPL-MCNC: 23 MCG/ML (ref 5–20)
WBC NRBC COR # BLD: 4.94 10*3/MM3 (ref 4.5–10.7)

## 2018-09-23 PROCEDURE — 99231 SBSQ HOSP IP/OBS SF/LOW 25: CPT | Performed by: NURSE PRACTITIONER

## 2018-09-23 PROCEDURE — 80202 ASSAY OF VANCOMYCIN: CPT | Performed by: INTERNAL MEDICINE

## 2018-09-23 PROCEDURE — 86140 C-REACTIVE PROTEIN: CPT | Performed by: INTERNAL MEDICINE

## 2018-09-23 PROCEDURE — 25010000002 ENOXAPARIN PER 10 MG: Performed by: INTERNAL MEDICINE

## 2018-09-23 PROCEDURE — 25010000002 VANCOMYCIN 10 G RECONSTITUTED SOLUTION: Performed by: INTERNAL MEDICINE

## 2018-09-23 PROCEDURE — 80048 BASIC METABOLIC PNL TOTAL CA: CPT | Performed by: INTERNAL MEDICINE

## 2018-09-23 PROCEDURE — 82962 GLUCOSE BLOOD TEST: CPT

## 2018-09-23 PROCEDURE — 85025 COMPLETE CBC W/AUTO DIFF WBC: CPT | Performed by: INTERNAL MEDICINE

## 2018-09-23 PROCEDURE — 85652 RBC SED RATE AUTOMATED: CPT | Performed by: INTERNAL MEDICINE

## 2018-09-23 PROCEDURE — 25010000002 PIPERACILLIN SOD-TAZOBACTAM PER 1 G: Performed by: INTERNAL MEDICINE

## 2018-09-23 PROCEDURE — 25010000002 VANCOMYCIN PER 500 MG: Performed by: INTERNAL MEDICINE

## 2018-09-23 RX ORDER — NICOTINE POLACRILEX 4 MG
15 LOZENGE BUCCAL
Status: DISCONTINUED | OUTPATIENT
Start: 2018-09-23 | End: 2018-09-28 | Stop reason: HOSPADM

## 2018-09-23 RX ORDER — VANCOMYCIN HYDROCHLORIDE 1 G/200ML
1000 INJECTION, SOLUTION INTRAVENOUS EVERY 12 HOURS
Status: DISCONTINUED | OUTPATIENT
Start: 2018-09-23 | End: 2018-09-24

## 2018-09-23 RX ORDER — DEXTROSE MONOHYDRATE 25 G/50ML
25 INJECTION, SOLUTION INTRAVENOUS
Status: DISCONTINUED | OUTPATIENT
Start: 2018-09-23 | End: 2018-09-28 | Stop reason: HOSPADM

## 2018-09-23 RX ORDER — CETIRIZINE HYDROCHLORIDE 10 MG/1
10 TABLET ORAL DAILY
Status: DISCONTINUED | OUTPATIENT
Start: 2018-09-23 | End: 2018-09-28 | Stop reason: HOSPADM

## 2018-09-23 RX ADMIN — DOCUSATE SODIUM 100 MG: 100 CAPSULE, LIQUID FILLED ORAL at 12:09

## 2018-09-23 RX ADMIN — CLONAZEPAM 1 MG: 1 TABLET ORAL at 15:17

## 2018-09-23 RX ADMIN — CETIRIZINE HYDROCHLORIDE 10 MG: 10 TABLET, FILM COATED ORAL at 15:17

## 2018-09-23 RX ADMIN — METOPROLOL TARTRATE 12.5 MG: 25 TABLET ORAL at 20:44

## 2018-09-23 RX ADMIN — FLUOXETINE HYDROCHLORIDE 40 MG: 20 CAPSULE ORAL at 09:03

## 2018-09-23 RX ADMIN — VANCOMYCIN HYDROCHLORIDE 1500 MG: 10 INJECTION, POWDER, LYOPHILIZED, FOR SOLUTION INTRAVENOUS at 03:46

## 2018-09-23 RX ADMIN — PANTOPRAZOLE SODIUM 40 MG: 40 TABLET, DELAYED RELEASE ORAL at 05:45

## 2018-09-23 RX ADMIN — CLONAZEPAM 1 MG: 1 TABLET ORAL at 21:35

## 2018-09-23 RX ADMIN — DOCUSATE SODIUM 100 MG: 100 CAPSULE, LIQUID FILLED ORAL at 20:47

## 2018-09-23 RX ADMIN — CLOPIDOGREL 75 MG: 75 TABLET, FILM COATED ORAL at 09:03

## 2018-09-23 RX ADMIN — HYDROCODONE BITARTRATE AND ACETAMINOPHEN 1 TABLET: 10; 325 TABLET ORAL at 18:04

## 2018-09-23 RX ADMIN — CLONAZEPAM 1 MG: 1 TABLET ORAL at 09:11

## 2018-09-23 RX ADMIN — CARBAMAZEPINE 200 MG: 200 TABLET ORAL at 09:03

## 2018-09-23 RX ADMIN — OLANZAPINE 2.5 MG: 2.5 TABLET, FILM COATED ORAL at 20:45

## 2018-09-23 RX ADMIN — SODIUM CHLORIDE 75 ML/HR: 4.5 INJECTION, SOLUTION INTRAVENOUS at 15:17

## 2018-09-23 RX ADMIN — METOPROLOL TARTRATE 12.5 MG: 25 TABLET ORAL at 09:03

## 2018-09-23 RX ADMIN — VANCOMYCIN HYDROCHLORIDE 1000 MG: 1 INJECTION, SOLUTION INTRAVENOUS at 21:35

## 2018-09-23 RX ADMIN — CHOLECALCIFEROL TAB 10 MCG (400 UNIT) 400 UNITS: 10 TAB at 09:03

## 2018-09-23 RX ADMIN — TAZOBACTAM SODIUM AND PIPERACILLIN SODIUM 3.38 G: 375; 3 INJECTION, SOLUTION INTRAVENOUS at 22:35

## 2018-09-23 RX ADMIN — TAZOBACTAM SODIUM AND PIPERACILLIN SODIUM 3.38 G: 375; 3 INJECTION, SOLUTION INTRAVENOUS at 09:03

## 2018-09-23 RX ADMIN — HYDROCODONE BITARTRATE AND ACETAMINOPHEN 1 TABLET: 10; 325 TABLET ORAL at 05:54

## 2018-09-23 RX ADMIN — TAZOBACTAM SODIUM AND PIPERACILLIN SODIUM 3.38 G: 375; 3 INJECTION, SOLUTION INTRAVENOUS at 15:17

## 2018-09-23 RX ADMIN — SODIUM CHLORIDE 75 ML/HR: 4.5 INJECTION, SOLUTION INTRAVENOUS at 03:46

## 2018-09-23 RX ADMIN — Medication 140 MG: at 09:03

## 2018-09-23 RX ADMIN — CARBAMAZEPINE 200 MG: 200 TABLET ORAL at 20:45

## 2018-09-23 RX ADMIN — HYDROCODONE BITARTRATE AND ACETAMINOPHEN 1 TABLET: 10; 325 TABLET ORAL at 12:09

## 2018-09-23 RX ADMIN — ENOXAPARIN SODIUM 40 MG: 40 INJECTION SUBCUTANEOUS at 05:44

## 2018-09-24 LAB
ANION GAP SERPL CALCULATED.3IONS-SCNC: 9.8 MMOL/L
BASOPHILS # BLD AUTO: 0.02 10*3/MM3 (ref 0–0.2)
BASOPHILS NFR BLD AUTO: 0.4 % (ref 0–1.5)
BUN BLD-MCNC: 6 MG/DL (ref 8–23)
BUN/CREAT SERPL: 8.8 (ref 7–25)
CALCIUM SPEC-SCNC: 8.6 MG/DL (ref 8.6–10.5)
CHLORIDE SERPL-SCNC: 110 MMOL/L (ref 98–107)
CO2 SERPL-SCNC: 26.2 MMOL/L (ref 22–29)
CREAT BLD-MCNC: 0.68 MG/DL (ref 0.57–1)
DEPRECATED RDW RBC AUTO: 45 FL (ref 37–54)
EOSINOPHIL # BLD AUTO: 0.28 10*3/MM3 (ref 0–0.7)
EOSINOPHIL NFR BLD AUTO: 5.4 % (ref 0.3–6.2)
ERYTHROCYTE [DISTWIDTH] IN BLOOD BY AUTOMATED COUNT: 12.5 % (ref 11.7–13)
GFR SERPL CREATININE-BSD FRML MDRD: 88 ML/MIN/1.73
GLUCOSE BLD-MCNC: 91 MG/DL (ref 65–99)
GLUCOSE BLDC GLUCOMTR-MCNC: 126 MG/DL (ref 70–130)
GLUCOSE BLDC GLUCOMTR-MCNC: 170 MG/DL (ref 70–130)
GLUCOSE BLDC GLUCOMTR-MCNC: 91 MG/DL (ref 70–130)
GLUCOSE BLDC GLUCOMTR-MCNC: 93 MG/DL (ref 70–130)
HCT VFR BLD AUTO: 34.4 % (ref 35.6–45.5)
HGB BLD-MCNC: 10.9 G/DL (ref 11.9–15.5)
IMM GRANULOCYTES # BLD: 0.06 10*3/MM3 (ref 0–0.03)
IMM GRANULOCYTES NFR BLD: 1.2 % (ref 0–0.5)
LYMPHOCYTES # BLD AUTO: 1.66 10*3/MM3 (ref 0.9–4.8)
LYMPHOCYTES NFR BLD AUTO: 32 % (ref 19.6–45.3)
MCH RBC QN AUTO: 31.7 PG (ref 26.9–32)
MCHC RBC AUTO-ENTMCNC: 31.7 G/DL (ref 32.4–36.3)
MCV RBC AUTO: 100 FL (ref 80.5–98.2)
MONOCYTES # BLD AUTO: 0.81 10*3/MM3 (ref 0.2–1.2)
MONOCYTES NFR BLD AUTO: 15.6 % (ref 5–12)
NEUTROPHILS # BLD AUTO: 2.35 10*3/MM3 (ref 1.9–8.1)
NEUTROPHILS NFR BLD AUTO: 45.4 % (ref 42.7–76)
PLATELET # BLD AUTO: 189 10*3/MM3 (ref 140–500)
PMV BLD AUTO: 9.3 FL (ref 6–12)
POTASSIUM BLD-SCNC: 4 MMOL/L (ref 3.5–5.2)
RBC # BLD AUTO: 3.44 10*6/MM3 (ref 3.9–5.2)
SODIUM BLD-SCNC: 146 MMOL/L (ref 136–145)
WBC NRBC COR # BLD: 5.18 10*3/MM3 (ref 4.5–10.7)

## 2018-09-24 PROCEDURE — 25010000002 ENOXAPARIN PER 10 MG: Performed by: INTERNAL MEDICINE

## 2018-09-24 PROCEDURE — 85025 COMPLETE CBC W/AUTO DIFF WBC: CPT | Performed by: INTERNAL MEDICINE

## 2018-09-24 PROCEDURE — 80048 BASIC METABOLIC PNL TOTAL CA: CPT | Performed by: INTERNAL MEDICINE

## 2018-09-24 PROCEDURE — 99232 SBSQ HOSP IP/OBS MODERATE 35: CPT | Performed by: INTERNAL MEDICINE

## 2018-09-24 PROCEDURE — 25010000003 CEFTRIAXONE PER 250 MG: Performed by: INTERNAL MEDICINE

## 2018-09-24 PROCEDURE — 25010000002 LORAZEPAM PER 2 MG: Performed by: INTERNAL MEDICINE

## 2018-09-24 PROCEDURE — 82962 GLUCOSE BLOOD TEST: CPT

## 2018-09-24 PROCEDURE — 63710000001 INSULIN ASPART PER 5 UNITS: Performed by: INTERNAL MEDICINE

## 2018-09-24 PROCEDURE — 25010000002 PIPERACILLIN SOD-TAZOBACTAM PER 1 G: Performed by: INTERNAL MEDICINE

## 2018-09-24 PROCEDURE — 25010000002 VANCOMYCIN PER 500 MG: Performed by: INTERNAL MEDICINE

## 2018-09-24 RX ORDER — LORAZEPAM 2 MG/ML
1 INJECTION INTRAMUSCULAR EVERY 6 HOURS PRN
Status: DISCONTINUED | OUTPATIENT
Start: 2018-09-24 | End: 2018-09-28 | Stop reason: HOSPADM

## 2018-09-24 RX ORDER — CEFTRIAXONE SODIUM 2 G/50ML
2 INJECTION, SOLUTION INTRAVENOUS EVERY 24 HOURS
Status: DISCONTINUED | OUTPATIENT
Start: 2018-09-24 | End: 2018-09-28 | Stop reason: HOSPADM

## 2018-09-24 RX ORDER — NITROGLYCERIN 0.4 MG/1
0.4 TABLET SUBLINGUAL
Status: DISCONTINUED | OUTPATIENT
Start: 2018-09-24 | End: 2018-09-28 | Stop reason: HOSPADM

## 2018-09-24 RX ADMIN — CARBAMAZEPINE 200 MG: 200 TABLET ORAL at 09:52

## 2018-09-24 RX ADMIN — PANTOPRAZOLE SODIUM 40 MG: 40 TABLET, DELAYED RELEASE ORAL at 06:25

## 2018-09-24 RX ADMIN — HYDROCODONE BITARTRATE AND ACETAMINOPHEN 1 TABLET: 5; 325 TABLET ORAL at 05:58

## 2018-09-24 RX ADMIN — SODIUM CHLORIDE 75 ML/HR: 4.5 INJECTION, SOLUTION INTRAVENOUS at 18:57

## 2018-09-24 RX ADMIN — INSULIN ASPART 2 UNITS: 100 INJECTION, SOLUTION INTRAVENOUS; SUBCUTANEOUS at 12:04

## 2018-09-24 RX ADMIN — SODIUM CHLORIDE 75 ML/HR: 4.5 INJECTION, SOLUTION INTRAVENOUS at 05:48

## 2018-09-24 RX ADMIN — CARBAMAZEPINE 200 MG: 200 TABLET ORAL at 20:03

## 2018-09-24 RX ADMIN — FLUOXETINE HYDROCHLORIDE 40 MG: 20 CAPSULE ORAL at 09:53

## 2018-09-24 RX ADMIN — LORAZEPAM 1 MG: 2 INJECTION INTRAMUSCULAR; INTRAVENOUS at 16:31

## 2018-09-24 RX ADMIN — CEFTRIAXONE SODIUM 2 G: 2 INJECTION, SOLUTION INTRAVENOUS at 18:57

## 2018-09-24 RX ADMIN — HYDROCODONE BITARTRATE AND ACETAMINOPHEN 1 TABLET: 5; 325 TABLET ORAL at 20:05

## 2018-09-24 RX ADMIN — OLANZAPINE 2.5 MG: 2.5 TABLET, FILM COATED ORAL at 20:03

## 2018-09-24 RX ADMIN — METOPROLOL TARTRATE 12.5 MG: 25 TABLET ORAL at 20:04

## 2018-09-24 RX ADMIN — Medication 140 MG: at 12:04

## 2018-09-24 RX ADMIN — ENOXAPARIN SODIUM 40 MG: 40 INJECTION SUBCUTANEOUS at 06:25

## 2018-09-24 RX ADMIN — DOCUSATE SODIUM 100 MG: 100 CAPSULE, LIQUID FILLED ORAL at 20:03

## 2018-09-24 RX ADMIN — CLOPIDOGREL 75 MG: 75 TABLET, FILM COATED ORAL at 09:53

## 2018-09-24 RX ADMIN — CHOLECALCIFEROL TAB 10 MCG (400 UNIT) 400 UNITS: 10 TAB at 09:53

## 2018-09-24 RX ADMIN — METOPROLOL TARTRATE 12.5 MG: 25 TABLET ORAL at 09:51

## 2018-09-24 RX ADMIN — CETIRIZINE HYDROCHLORIDE 10 MG: 10 TABLET, FILM COATED ORAL at 09:53

## 2018-09-24 RX ADMIN — DOCUSATE SODIUM 100 MG: 100 CAPSULE, LIQUID FILLED ORAL at 09:52

## 2018-09-24 RX ADMIN — HYDROCODONE BITARTRATE AND ACETAMINOPHEN 1 TABLET: 10; 325 TABLET ORAL at 14:31

## 2018-09-24 RX ADMIN — VANCOMYCIN HYDROCHLORIDE 1000 MG: 1 INJECTION, SOLUTION INTRAVENOUS at 09:54

## 2018-09-24 RX ADMIN — CLONAZEPAM 1 MG: 1 TABLET ORAL at 11:35

## 2018-09-24 RX ADMIN — ZOLPIDEM TARTRATE 10 MG: 5 TABLET ORAL at 22:13

## 2018-09-24 RX ADMIN — CLONAZEPAM 1 MG: 1 TABLET ORAL at 20:05

## 2018-09-24 RX ADMIN — HYDROCODONE BITARTRATE AND ACETAMINOPHEN 1 TABLET: 5; 325 TABLET ORAL at 01:53

## 2018-09-24 RX ADMIN — TAZOBACTAM SODIUM AND PIPERACILLIN SODIUM 3.38 G: 375; 3 INJECTION, SOLUTION INTRAVENOUS at 06:31

## 2018-09-25 PROBLEM — M86.9 OSTEOMYELITIS OF TOE OF RIGHT FOOT (HCC): Status: ACTIVE | Noted: 2018-09-25

## 2018-09-25 LAB
ANION GAP SERPL CALCULATED.3IONS-SCNC: 9.1 MMOL/L
BACTERIA SPEC AEROBE CULT: NORMAL
BASOPHILS # BLD AUTO: 0.03 10*3/MM3 (ref 0–0.2)
BASOPHILS NFR BLD AUTO: 0.6 % (ref 0–1.5)
BUN BLD-MCNC: 9 MG/DL (ref 8–23)
BUN/CREAT SERPL: 12.2 (ref 7–25)
CALCIUM SPEC-SCNC: 8.6 MG/DL (ref 8.6–10.5)
CHLORIDE SERPL-SCNC: 107 MMOL/L (ref 98–107)
CO2 SERPL-SCNC: 27.9 MMOL/L (ref 22–29)
CREAT BLD-MCNC: 0.74 MG/DL (ref 0.57–1)
DEPRECATED RDW RBC AUTO: 45.6 FL (ref 37–54)
EOSINOPHIL # BLD AUTO: 0.21 10*3/MM3 (ref 0–0.7)
EOSINOPHIL NFR BLD AUTO: 4.2 % (ref 0.3–6.2)
ERYTHROCYTE [DISTWIDTH] IN BLOOD BY AUTOMATED COUNT: 12.4 % (ref 11.7–13)
GFR SERPL CREATININE-BSD FRML MDRD: 80 ML/MIN/1.73
GLUCOSE BLD-MCNC: 104 MG/DL (ref 65–99)
GLUCOSE BLDC GLUCOMTR-MCNC: 113 MG/DL (ref 70–130)
GLUCOSE BLDC GLUCOMTR-MCNC: 115 MG/DL (ref 70–130)
GLUCOSE BLDC GLUCOMTR-MCNC: 120 MG/DL (ref 70–130)
GLUCOSE BLDC GLUCOMTR-MCNC: 127 MG/DL (ref 70–130)
HCT VFR BLD AUTO: 34.4 % (ref 35.6–45.5)
HGB BLD-MCNC: 11 G/DL (ref 11.9–15.5)
IMM GRANULOCYTES # BLD: 0.05 10*3/MM3 (ref 0–0.03)
IMM GRANULOCYTES NFR BLD: 1 % (ref 0–0.5)
LYMPHOCYTES # BLD AUTO: 1.48 10*3/MM3 (ref 0.9–4.8)
LYMPHOCYTES NFR BLD AUTO: 29.3 % (ref 19.6–45.3)
MCH RBC QN AUTO: 32 PG (ref 26.9–32)
MCHC RBC AUTO-ENTMCNC: 32 G/DL (ref 32.4–36.3)
MCV RBC AUTO: 100 FL (ref 80.5–98.2)
MONOCYTES # BLD AUTO: 0.72 10*3/MM3 (ref 0.2–1.2)
MONOCYTES NFR BLD AUTO: 14.3 % (ref 5–12)
NEUTROPHILS # BLD AUTO: 2.56 10*3/MM3 (ref 1.9–8.1)
NEUTROPHILS NFR BLD AUTO: 50.6 % (ref 42.7–76)
PLATELET # BLD AUTO: 176 10*3/MM3 (ref 140–500)
PMV BLD AUTO: 9.4 FL (ref 6–12)
POTASSIUM BLD-SCNC: 3.8 MMOL/L (ref 3.5–5.2)
RBC # BLD AUTO: 3.44 10*6/MM3 (ref 3.9–5.2)
SODIUM BLD-SCNC: 144 MMOL/L (ref 136–145)
VANCOMYCIN TROUGH SERPL-MCNC: 14.2 MCG/ML (ref 5–20)
WBC NRBC COR # BLD: 5.05 10*3/MM3 (ref 4.5–10.7)

## 2018-09-25 PROCEDURE — 85025 COMPLETE CBC W/AUTO DIFF WBC: CPT | Performed by: INTERNAL MEDICINE

## 2018-09-25 PROCEDURE — 99231 SBSQ HOSP IP/OBS SF/LOW 25: CPT | Performed by: INTERNAL MEDICINE

## 2018-09-25 PROCEDURE — 82962 GLUCOSE BLOOD TEST: CPT

## 2018-09-25 PROCEDURE — 25010000002 ENOXAPARIN PER 10 MG: Performed by: INTERNAL MEDICINE

## 2018-09-25 PROCEDURE — 80202 ASSAY OF VANCOMYCIN: CPT | Performed by: INTERNAL MEDICINE

## 2018-09-25 PROCEDURE — 97110 THERAPEUTIC EXERCISES: CPT

## 2018-09-25 PROCEDURE — 80048 BASIC METABOLIC PNL TOTAL CA: CPT | Performed by: INTERNAL MEDICINE

## 2018-09-25 PROCEDURE — 25010000002 ONDANSETRON PER 1 MG: Performed by: INTERNAL MEDICINE

## 2018-09-25 PROCEDURE — 97162 PT EVAL MOD COMPLEX 30 MIN: CPT

## 2018-09-25 PROCEDURE — 25010000003 CEFTRIAXONE PER 250 MG: Performed by: INTERNAL MEDICINE

## 2018-09-25 RX ORDER — LOPERAMIDE HYDROCHLORIDE 2 MG/1
2 CAPSULE ORAL EVERY 6 HOURS PRN
Status: DISCONTINUED | OUTPATIENT
Start: 2018-09-25 | End: 2018-09-28 | Stop reason: HOSPADM

## 2018-09-25 RX ORDER — HYDROCODONE BITARTRATE AND ACETAMINOPHEN 10; 325 MG/1; MG/1
1 TABLET ORAL EVERY 4 HOURS PRN
Status: DISCONTINUED | OUTPATIENT
Start: 2018-09-25 | End: 2018-09-28 | Stop reason: HOSPADM

## 2018-09-25 RX ADMIN — HYDROCODONE BITARTRATE AND ACETAMINOPHEN 1 TABLET: 10; 325 TABLET ORAL at 09:54

## 2018-09-25 RX ADMIN — HYDROCODONE BITARTRATE AND ACETAMINOPHEN 1 TABLET: 10; 325 TABLET ORAL at 09:53

## 2018-09-25 RX ADMIN — Medication 140 MG: at 09:59

## 2018-09-25 RX ADMIN — PANTOPRAZOLE SODIUM 40 MG: 40 TABLET, DELAYED RELEASE ORAL at 05:18

## 2018-09-25 RX ADMIN — CARBAMAZEPINE 200 MG: 200 TABLET ORAL at 20:04

## 2018-09-25 RX ADMIN — CLONAZEPAM 1 MG: 1 TABLET ORAL at 21:42

## 2018-09-25 RX ADMIN — CLONAZEPAM 1 MG: 1 TABLET ORAL at 14:54

## 2018-09-25 RX ADMIN — CLOPIDOGREL 75 MG: 75 TABLET, FILM COATED ORAL at 09:53

## 2018-09-25 RX ADMIN — FLUOXETINE HYDROCHLORIDE 40 MG: 20 CAPSULE ORAL at 09:53

## 2018-09-25 RX ADMIN — DOCUSATE SODIUM 100 MG: 100 CAPSULE, LIQUID FILLED ORAL at 20:05

## 2018-09-25 RX ADMIN — SODIUM CHLORIDE 75 ML/HR: 4.5 INJECTION, SOLUTION INTRAVENOUS at 07:17

## 2018-09-25 RX ADMIN — CLONAZEPAM 1 MG: 1 TABLET ORAL at 04:30

## 2018-09-25 RX ADMIN — HYDROCODONE BITARTRATE AND ACETAMINOPHEN 1 TABLET: 10; 325 TABLET ORAL at 23:18

## 2018-09-25 RX ADMIN — METOPROLOL TARTRATE 12.5 MG: 25 TABLET ORAL at 20:05

## 2018-09-25 RX ADMIN — ZOLPIDEM TARTRATE 10 MG: 5 TABLET ORAL at 21:42

## 2018-09-25 RX ADMIN — OLANZAPINE 2.5 MG: 2.5 TABLET, FILM COATED ORAL at 20:04

## 2018-09-25 RX ADMIN — CETIRIZINE HYDROCHLORIDE 10 MG: 10 TABLET, FILM COATED ORAL at 09:53

## 2018-09-25 RX ADMIN — CARBAMAZEPINE 200 MG: 200 TABLET ORAL at 09:53

## 2018-09-25 RX ADMIN — HYDROCODONE BITARTRATE AND ACETAMINOPHEN 1 TABLET: 5; 325 TABLET ORAL at 14:54

## 2018-09-25 RX ADMIN — METOPROLOL TARTRATE 12.5 MG: 25 TABLET ORAL at 09:54

## 2018-09-25 RX ADMIN — HYDROCODONE BITARTRATE AND ACETAMINOPHEN 1 TABLET: 10; 325 TABLET ORAL at 04:01

## 2018-09-25 RX ADMIN — ONDANSETRON 4 MG: 2 INJECTION INTRAMUSCULAR; INTRAVENOUS at 20:09

## 2018-09-25 RX ADMIN — CHOLECALCIFEROL TAB 10 MCG (400 UNIT) 400 UNITS: 10 TAB at 09:54

## 2018-09-25 RX ADMIN — SODIUM CHLORIDE 75 ML/HR: 4.5 INJECTION, SOLUTION INTRAVENOUS at 20:04

## 2018-09-25 RX ADMIN — CEFTRIAXONE SODIUM 2 G: 2 INJECTION, SOLUTION INTRAVENOUS at 15:22

## 2018-09-25 RX ADMIN — CLONAZEPAM 1 MG: 1 TABLET ORAL at 09:53

## 2018-09-25 RX ADMIN — ENOXAPARIN SODIUM 40 MG: 40 INJECTION SUBCUTANEOUS at 05:18

## 2018-09-25 RX ADMIN — DOCUSATE SODIUM 100 MG: 100 CAPSULE, LIQUID FILLED ORAL at 09:54

## 2018-09-26 LAB
ANION GAP SERPL CALCULATED.3IONS-SCNC: 10.3 MMOL/L
BASOPHILS # BLD AUTO: 0.02 10*3/MM3 (ref 0–0.2)
BASOPHILS NFR BLD AUTO: 0.3 % (ref 0–1.5)
BUN BLD-MCNC: 8 MG/DL (ref 8–23)
BUN/CREAT SERPL: 10.8 (ref 7–25)
CALCIUM SPEC-SCNC: 8.9 MG/DL (ref 8.6–10.5)
CHLORIDE SERPL-SCNC: 106 MMOL/L (ref 98–107)
CO2 SERPL-SCNC: 27.7 MMOL/L (ref 22–29)
CREAT BLD-MCNC: 0.74 MG/DL (ref 0.57–1)
DEPRECATED RDW RBC AUTO: 45.1 FL (ref 37–54)
EOSINOPHIL # BLD AUTO: 0.22 10*3/MM3 (ref 0–0.7)
EOSINOPHIL NFR BLD AUTO: 3.4 % (ref 0.3–6.2)
ERYTHROCYTE [DISTWIDTH] IN BLOOD BY AUTOMATED COUNT: 12.5 % (ref 11.7–13)
GFR SERPL CREATININE-BSD FRML MDRD: 80 ML/MIN/1.73
GLUCOSE BLD-MCNC: 95 MG/DL (ref 65–99)
GLUCOSE BLDC GLUCOMTR-MCNC: 103 MG/DL (ref 70–130)
GLUCOSE BLDC GLUCOMTR-MCNC: 105 MG/DL (ref 70–130)
GLUCOSE BLDC GLUCOMTR-MCNC: 113 MG/DL (ref 70–130)
GLUCOSE BLDC GLUCOMTR-MCNC: 113 MG/DL (ref 70–130)
HCT VFR BLD AUTO: 34.4 % (ref 35.6–45.5)
HGB BLD-MCNC: 11.4 G/DL (ref 11.9–15.5)
IMM GRANULOCYTES # BLD: 0.05 10*3/MM3 (ref 0–0.03)
IMM GRANULOCYTES NFR BLD: 0.8 % (ref 0–0.5)
LYMPHOCYTES # BLD AUTO: 1.78 10*3/MM3 (ref 0.9–4.8)
LYMPHOCYTES NFR BLD AUTO: 27.8 % (ref 19.6–45.3)
MCH RBC QN AUTO: 32.7 PG (ref 26.9–32)
MCHC RBC AUTO-ENTMCNC: 33.1 G/DL (ref 32.4–36.3)
MCV RBC AUTO: 98.6 FL (ref 80.5–98.2)
MONOCYTES # BLD AUTO: 0.77 10*3/MM3 (ref 0.2–1.2)
MONOCYTES NFR BLD AUTO: 12 % (ref 5–12)
NEUTROPHILS # BLD AUTO: 3.62 10*3/MM3 (ref 1.9–8.1)
NEUTROPHILS NFR BLD AUTO: 56.5 % (ref 42.7–76)
PLATELET # BLD AUTO: 187 10*3/MM3 (ref 140–500)
PMV BLD AUTO: 9.5 FL (ref 6–12)
POTASSIUM BLD-SCNC: 3.6 MMOL/L (ref 3.5–5.2)
RBC # BLD AUTO: 3.49 10*6/MM3 (ref 3.9–5.2)
SODIUM BLD-SCNC: 144 MMOL/L (ref 136–145)
WBC NRBC COR # BLD: 6.41 10*3/MM3 (ref 4.5–10.7)

## 2018-09-26 PROCEDURE — 90661 CCIIV3 VAC ABX FR 0.5 ML IM: CPT | Performed by: INTERNAL MEDICINE

## 2018-09-26 PROCEDURE — G0008 ADMIN INFLUENZA VIRUS VAC: HCPCS | Performed by: INTERNAL MEDICINE

## 2018-09-26 PROCEDURE — 25010000002 ENOXAPARIN PER 10 MG: Performed by: INTERNAL MEDICINE

## 2018-09-26 PROCEDURE — 25010000002 INFLUENZA VAC SUBUNIT QUAD 0.5 ML SUSPENSION PREFILLED SYRINGE: Performed by: INTERNAL MEDICINE

## 2018-09-26 PROCEDURE — 99231 SBSQ HOSP IP/OBS SF/LOW 25: CPT | Performed by: INTERNAL MEDICINE

## 2018-09-26 PROCEDURE — 25010000002 MORPHINE PER 10 MG: Performed by: INTERNAL MEDICINE

## 2018-09-26 PROCEDURE — 80048 BASIC METABOLIC PNL TOTAL CA: CPT | Performed by: INTERNAL MEDICINE

## 2018-09-26 PROCEDURE — 85025 COMPLETE CBC W/AUTO DIFF WBC: CPT | Performed by: INTERNAL MEDICINE

## 2018-09-26 PROCEDURE — 82962 GLUCOSE BLOOD TEST: CPT

## 2018-09-26 PROCEDURE — 97110 THERAPEUTIC EXERCISES: CPT

## 2018-09-26 PROCEDURE — 25010000003 CEFTRIAXONE PER 250 MG: Performed by: INTERNAL MEDICINE

## 2018-09-26 RX ADMIN — SODIUM CHLORIDE 75 ML/HR: 4.5 INJECTION, SOLUTION INTRAVENOUS at 20:27

## 2018-09-26 RX ADMIN — FLUOXETINE HYDROCHLORIDE 40 MG: 20 CAPSULE ORAL at 09:40

## 2018-09-26 RX ADMIN — MORPHINE SULFATE 1 MG: 2 INJECTION, SOLUTION INTRAMUSCULAR; INTRAVENOUS at 18:01

## 2018-09-26 RX ADMIN — ACETAMINOPHEN 650 MG: 325 TABLET, FILM COATED ORAL at 01:05

## 2018-09-26 RX ADMIN — ACETAMINOPHEN 650 MG: 325 TABLET, FILM COATED ORAL at 20:26

## 2018-09-26 RX ADMIN — HYDROCODONE BITARTRATE AND ACETAMINOPHEN 1 TABLET: 10; 325 TABLET ORAL at 21:46

## 2018-09-26 RX ADMIN — CETIRIZINE HYDROCHLORIDE 10 MG: 10 TABLET, FILM COATED ORAL at 09:40

## 2018-09-26 RX ADMIN — CLOPIDOGREL 75 MG: 75 TABLET, FILM COATED ORAL at 09:40

## 2018-09-26 RX ADMIN — CLONAZEPAM 1 MG: 1 TABLET ORAL at 09:43

## 2018-09-26 RX ADMIN — PANTOPRAZOLE SODIUM 40 MG: 40 TABLET, DELAYED RELEASE ORAL at 05:03

## 2018-09-26 RX ADMIN — INFLUENZA A VIRUS A/SINGAPORE/GP1908/2015 IVR-180 (H1N1) ANTIGEN (MDCK CELL DERIVED, PROPIOLACTONE INACTIVATED), INFLUENZA A VIRUS A/NORTH CAROLINA/04/2016 (H3N2) HEMAGGLUTININ ANTIGEN (MDCK CELL DERIVED, PROPIOLACTONE INACTIVATED), INFLUENZA B VIRUS B/IOWA/06/2017 HEMAGGLUTININ ANTIGEN (MDCK CELL DERIVED, PROPIOLACTONE INACTIVATED), INFLUENZA B VIRUS B/SINGAPORE/INFTT-16-0610/2016 HEMAGGLUTININ ANTIGEN (MDCK CELL DERIVED, PROPIOLACTONE INACTIVATED) 0.5 ML: 15; 15; 15; 15 INJECTION, SUSPENSION INTRAMUSCULAR at 12:15

## 2018-09-26 RX ADMIN — DOCUSATE SODIUM 100 MG: 100 CAPSULE, LIQUID FILLED ORAL at 20:26

## 2018-09-26 RX ADMIN — DOCUSATE SODIUM 100 MG: 100 CAPSULE, LIQUID FILLED ORAL at 09:41

## 2018-09-26 RX ADMIN — SODIUM CHLORIDE 75 ML/HR: 4.5 INJECTION, SOLUTION INTRAVENOUS at 07:02

## 2018-09-26 RX ADMIN — Medication 140 MG: at 09:40

## 2018-09-26 RX ADMIN — CARBAMAZEPINE 200 MG: 200 TABLET ORAL at 20:26

## 2018-09-26 RX ADMIN — DOCUSATE SODIUM 100 MG: 100 CAPSULE, LIQUID FILLED ORAL at 09:40

## 2018-09-26 RX ADMIN — OLANZAPINE 2.5 MG: 2.5 TABLET, FILM COATED ORAL at 20:26

## 2018-09-26 RX ADMIN — CEFTRIAXONE SODIUM 2 G: 2 INJECTION, SOLUTION INTRAVENOUS at 17:19

## 2018-09-26 RX ADMIN — CHOLECALCIFEROL TAB 10 MCG (400 UNIT) 400 UNITS: 10 TAB at 09:40

## 2018-09-26 RX ADMIN — ZOLPIDEM TARTRATE 10 MG: 5 TABLET ORAL at 23:29

## 2018-09-26 RX ADMIN — CARBAMAZEPINE 200 MG: 200 TABLET ORAL at 09:40

## 2018-09-26 RX ADMIN — METOPROLOL TARTRATE 12.5 MG: 25 TABLET ORAL at 20:26

## 2018-09-26 RX ADMIN — METOPROLOL TARTRATE 12.5 MG: 25 TABLET ORAL at 09:40

## 2018-09-26 RX ADMIN — HYDROCODONE BITARTRATE AND ACETAMINOPHEN 1 TABLET: 10; 325 TABLET ORAL at 07:06

## 2018-09-26 RX ADMIN — CLONAZEPAM 1 MG: 1 TABLET ORAL at 03:35

## 2018-09-26 RX ADMIN — CLONAZEPAM 1 MG: 1 TABLET ORAL at 18:01

## 2018-09-26 RX ADMIN — HYDROCODONE BITARTRATE AND ACETAMINOPHEN 1 TABLET: 10; 325 TABLET ORAL at 14:21

## 2018-09-26 RX ADMIN — ENOXAPARIN SODIUM 40 MG: 40 INJECTION SUBCUTANEOUS at 05:02

## 2018-09-27 LAB
ANION GAP SERPL CALCULATED.3IONS-SCNC: 11.1 MMOL/L
BASOPHILS # BLD AUTO: 0.02 10*3/MM3 (ref 0–0.2)
BASOPHILS NFR BLD AUTO: 0.3 % (ref 0–1.5)
BUN BLD-MCNC: 8 MG/DL (ref 8–23)
BUN/CREAT SERPL: 11.8 (ref 7–25)
CALCIUM SPEC-SCNC: 9 MG/DL (ref 8.6–10.5)
CHLORIDE SERPL-SCNC: 106 MMOL/L (ref 98–107)
CO2 SERPL-SCNC: 25.9 MMOL/L (ref 22–29)
CREAT BLD-MCNC: 0.68 MG/DL (ref 0.57–1)
DEPRECATED RDW RBC AUTO: 45.1 FL (ref 37–54)
EOSINOPHIL # BLD AUTO: 0.2 10*3/MM3 (ref 0–0.7)
EOSINOPHIL NFR BLD AUTO: 2.9 % (ref 0.3–6.2)
ERYTHROCYTE [DISTWIDTH] IN BLOOD BY AUTOMATED COUNT: 12.4 % (ref 11.7–13)
GFR SERPL CREATININE-BSD FRML MDRD: 88 ML/MIN/1.73
GLUCOSE BLD-MCNC: 95 MG/DL (ref 65–99)
GLUCOSE BLDC GLUCOMTR-MCNC: 104 MG/DL (ref 70–130)
GLUCOSE BLDC GLUCOMTR-MCNC: 106 MG/DL (ref 70–130)
GLUCOSE BLDC GLUCOMTR-MCNC: 137 MG/DL (ref 70–130)
GLUCOSE BLDC GLUCOMTR-MCNC: 97 MG/DL (ref 70–130)
HCT VFR BLD AUTO: 36.4 % (ref 35.6–45.5)
HGB BLD-MCNC: 11.5 G/DL (ref 11.9–15.5)
IMM GRANULOCYTES # BLD: 0.05 10*3/MM3 (ref 0–0.03)
IMM GRANULOCYTES NFR BLD: 0.7 % (ref 0–0.5)
LYMPHOCYTES # BLD AUTO: 1.87 10*3/MM3 (ref 0.9–4.8)
LYMPHOCYTES NFR BLD AUTO: 27.5 % (ref 19.6–45.3)
MCH RBC QN AUTO: 31.7 PG (ref 26.9–32)
MCHC RBC AUTO-ENTMCNC: 31.6 G/DL (ref 32.4–36.3)
MCV RBC AUTO: 100.3 FL (ref 80.5–98.2)
MONOCYTES # BLD AUTO: 0.83 10*3/MM3 (ref 0.2–1.2)
MONOCYTES NFR BLD AUTO: 12.2 % (ref 5–12)
NEUTROPHILS # BLD AUTO: 3.83 10*3/MM3 (ref 1.9–8.1)
NEUTROPHILS NFR BLD AUTO: 56.4 % (ref 42.7–76)
PLATELET # BLD AUTO: 161 10*3/MM3 (ref 140–500)
PMV BLD AUTO: 9.6 FL (ref 6–12)
POTASSIUM BLD-SCNC: 3.8 MMOL/L (ref 3.5–5.2)
RBC # BLD AUTO: 3.63 10*6/MM3 (ref 3.9–5.2)
SODIUM BLD-SCNC: 143 MMOL/L (ref 136–145)
WBC NRBC COR # BLD: 6.8 10*3/MM3 (ref 4.5–10.7)

## 2018-09-27 PROCEDURE — 85025 COMPLETE CBC W/AUTO DIFF WBC: CPT | Performed by: INTERNAL MEDICINE

## 2018-09-27 PROCEDURE — 80048 BASIC METABOLIC PNL TOTAL CA: CPT | Performed by: INTERNAL MEDICINE

## 2018-09-27 PROCEDURE — 82962 GLUCOSE BLOOD TEST: CPT

## 2018-09-27 PROCEDURE — 25010000003 CEFTRIAXONE PER 250 MG: Performed by: INTERNAL MEDICINE

## 2018-09-27 PROCEDURE — 97110 THERAPEUTIC EXERCISES: CPT

## 2018-09-27 PROCEDURE — 25010000002 ENOXAPARIN PER 10 MG: Performed by: INTERNAL MEDICINE

## 2018-09-27 PROCEDURE — 25010000002 MORPHINE PER 10 MG: Performed by: INTERNAL MEDICINE

## 2018-09-27 RX ORDER — CYCLOBENZAPRINE HCL 10 MG
10 TABLET ORAL 3 TIMES DAILY PRN
Qty: 40 TABLET | Refills: 1 | Status: SHIPPED | OUTPATIENT
Start: 2018-09-27

## 2018-09-27 RX ORDER — LOPERAMIDE HYDROCHLORIDE 2 MG/1
2 CAPSULE ORAL EVERY 6 HOURS PRN
Start: 2018-09-27

## 2018-09-27 RX ORDER — TRAMADOL HYDROCHLORIDE 50 MG/1
50 TABLET ORAL EVERY 6 HOURS PRN
Qty: 30 TABLET | Refills: 0 | Status: SHIPPED | OUTPATIENT
Start: 2018-09-27 | End: 2018-09-30

## 2018-09-27 RX ORDER — SODIUM CHLORIDE 0.9 % (FLUSH) 0.9 %
20 SYRINGE (ML) INJECTION AS NEEDED
Qty: 600 ML | Refills: 2 | Status: SHIPPED | OUTPATIENT
Start: 2018-09-27

## 2018-09-27 RX ORDER — UREA 10 %
140 LOTION (ML) TOPICAL
Qty: 30 TABLET | Refills: 5 | Status: SHIPPED | OUTPATIENT
Start: 2018-09-27 | End: 2019-03-27

## 2018-09-27 RX ORDER — CEFTRIAXONE SODIUM 2 G/50ML
2 INJECTION, SOLUTION INTRAVENOUS EVERY 24 HOURS
Qty: 1400 ML | Refills: 0 | Status: SHIPPED | OUTPATIENT
Start: 2018-09-27 | End: 2018-10-25

## 2018-09-27 RX ORDER — TRAMADOL HYDROCHLORIDE 50 MG/1
50 TABLET ORAL EVERY 6 HOURS PRN
Status: CANCELLED
Start: 2018-09-27 | End: 2018-10-02

## 2018-09-27 RX ORDER — PROMETHAZINE HYDROCHLORIDE 25 MG/1
25 TABLET ORAL EVERY 6 HOURS PRN
Qty: 40 TABLET | Refills: 1 | Status: SHIPPED | OUTPATIENT
Start: 2018-09-27

## 2018-09-27 RX ORDER — HYDROCODONE BITARTRATE AND ACETAMINOPHEN 10; 325 MG/1; MG/1
2 TABLET ORAL EVERY 4 HOURS PRN
Qty: 36 TABLET | Refills: 0 | Status: SHIPPED | OUTPATIENT
Start: 2018-09-27

## 2018-09-27 RX ADMIN — DOCUSATE SODIUM 100 MG: 100 CAPSULE, LIQUID FILLED ORAL at 09:41

## 2018-09-27 RX ADMIN — METOPROLOL TARTRATE 12.5 MG: 25 TABLET ORAL at 09:42

## 2018-09-27 RX ADMIN — Medication 140 MG: at 09:42

## 2018-09-27 RX ADMIN — OLANZAPINE 2.5 MG: 2.5 TABLET, FILM COATED ORAL at 20:24

## 2018-09-27 RX ADMIN — MORPHINE SULFATE 1 MG: 2 INJECTION, SOLUTION INTRAMUSCULAR; INTRAVENOUS at 01:07

## 2018-09-27 RX ADMIN — PANTOPRAZOLE SODIUM 40 MG: 40 TABLET, DELAYED RELEASE ORAL at 06:36

## 2018-09-27 RX ADMIN — HYDROCODONE BITARTRATE AND ACETAMINOPHEN 1 TABLET: 10; 325 TABLET ORAL at 06:36

## 2018-09-27 RX ADMIN — ENOXAPARIN SODIUM 40 MG: 40 INJECTION SUBCUTANEOUS at 06:36

## 2018-09-27 RX ADMIN — HYDROCODONE BITARTRATE AND ACETAMINOPHEN 1 TABLET: 10; 325 TABLET ORAL at 13:23

## 2018-09-27 RX ADMIN — MORPHINE SULFATE 1 MG: 2 INJECTION, SOLUTION INTRAMUSCULAR; INTRAVENOUS at 22:25

## 2018-09-27 RX ADMIN — CARBAMAZEPINE 200 MG: 200 TABLET ORAL at 09:41

## 2018-09-27 RX ADMIN — CHOLECALCIFEROL TAB 10 MCG (400 UNIT) 400 UNITS: 10 TAB at 09:42

## 2018-09-27 RX ADMIN — CLONAZEPAM 1 MG: 1 TABLET ORAL at 22:25

## 2018-09-27 RX ADMIN — CARBAMAZEPINE 200 MG: 200 TABLET ORAL at 20:24

## 2018-09-27 RX ADMIN — DOCUSATE SODIUM 100 MG: 100 CAPSULE, LIQUID FILLED ORAL at 22:36

## 2018-09-27 RX ADMIN — SODIUM CHLORIDE 75 ML/HR: 4.5 INJECTION, SOLUTION INTRAVENOUS at 06:36

## 2018-09-27 RX ADMIN — DOCUSATE SODIUM 100 MG: 100 CAPSULE, LIQUID FILLED ORAL at 20:27

## 2018-09-27 RX ADMIN — CEFTRIAXONE SODIUM 2 G: 2 INJECTION, SOLUTION INTRAVENOUS at 17:28

## 2018-09-27 RX ADMIN — CLONAZEPAM 1 MG: 1 TABLET ORAL at 13:23

## 2018-09-27 RX ADMIN — SODIUM CHLORIDE 75 ML/HR: 4.5 INJECTION, SOLUTION INTRAVENOUS at 17:28

## 2018-09-27 RX ADMIN — METOPROLOL TARTRATE 12.5 MG: 25 TABLET ORAL at 20:24

## 2018-09-27 RX ADMIN — CLOPIDOGREL 75 MG: 75 TABLET, FILM COATED ORAL at 09:42

## 2018-09-27 RX ADMIN — FLUOXETINE HYDROCHLORIDE 40 MG: 20 CAPSULE ORAL at 09:42

## 2018-09-27 RX ADMIN — CETIRIZINE HYDROCHLORIDE 10 MG: 10 TABLET, FILM COATED ORAL at 09:42

## 2018-09-28 VITALS
HEART RATE: 75 BPM | TEMPERATURE: 99.5 F | HEIGHT: 63 IN | OXYGEN SATURATION: 95 % | WEIGHT: 203.38 LBS | DIASTOLIC BLOOD PRESSURE: 75 MMHG | SYSTOLIC BLOOD PRESSURE: 127 MMHG | RESPIRATION RATE: 18 BRPM | BODY MASS INDEX: 36.04 KG/M2

## 2018-09-28 LAB
ANION GAP SERPL CALCULATED.3IONS-SCNC: 11.6 MMOL/L
BASOPHILS # BLD AUTO: 0.03 10*3/MM3 (ref 0–0.2)
BASOPHILS NFR BLD AUTO: 0.5 % (ref 0–1.5)
BUN BLD-MCNC: 8 MG/DL (ref 8–23)
BUN/CREAT SERPL: 11.3 (ref 7–25)
CALCIUM SPEC-SCNC: 9.2 MG/DL (ref 8.6–10.5)
CHLORIDE SERPL-SCNC: 107 MMOL/L (ref 98–107)
CO2 SERPL-SCNC: 26.4 MMOL/L (ref 22–29)
CREAT BLD-MCNC: 0.71 MG/DL (ref 0.57–1)
DEPRECATED RDW RBC AUTO: 45.9 FL (ref 37–54)
EOSINOPHIL # BLD AUTO: 0.23 10*3/MM3 (ref 0–0.7)
EOSINOPHIL NFR BLD AUTO: 3.5 % (ref 0.3–6.2)
ERYTHROCYTE [DISTWIDTH] IN BLOOD BY AUTOMATED COUNT: 12.5 % (ref 11.7–13)
GFR SERPL CREATININE-BSD FRML MDRD: 84 ML/MIN/1.73
GLUCOSE BLD-MCNC: 93 MG/DL (ref 65–99)
GLUCOSE BLDC GLUCOMTR-MCNC: 90 MG/DL (ref 70–130)
GLUCOSE BLDC GLUCOMTR-MCNC: 95 MG/DL (ref 70–130)
HCT VFR BLD AUTO: 37.6 % (ref 35.6–45.5)
HGB BLD-MCNC: 11.9 G/DL (ref 11.9–15.5)
IMM GRANULOCYTES # BLD: 0.05 10*3/MM3 (ref 0–0.03)
IMM GRANULOCYTES NFR BLD: 0.8 % (ref 0–0.5)
LYMPHOCYTES # BLD AUTO: 1.81 10*3/MM3 (ref 0.9–4.8)
LYMPHOCYTES NFR BLD AUTO: 27.8 % (ref 19.6–45.3)
MCH RBC QN AUTO: 31.9 PG (ref 26.9–32)
MCHC RBC AUTO-ENTMCNC: 31.6 G/DL (ref 32.4–36.3)
MCV RBC AUTO: 100.8 FL (ref 80.5–98.2)
MONOCYTES # BLD AUTO: 0.75 10*3/MM3 (ref 0.2–1.2)
MONOCYTES NFR BLD AUTO: 11.5 % (ref 5–12)
NEUTROPHILS # BLD AUTO: 3.63 10*3/MM3 (ref 1.9–8.1)
NEUTROPHILS NFR BLD AUTO: 55.9 % (ref 42.7–76)
PLATELET # BLD AUTO: 170 10*3/MM3 (ref 140–500)
PMV BLD AUTO: 10.1 FL (ref 6–12)
POTASSIUM BLD-SCNC: 4 MMOL/L (ref 3.5–5.2)
RBC # BLD AUTO: 3.73 10*6/MM3 (ref 3.9–5.2)
SODIUM BLD-SCNC: 145 MMOL/L (ref 136–145)
WBC NRBC COR # BLD: 6.5 10*3/MM3 (ref 4.5–10.7)

## 2018-09-28 PROCEDURE — 85025 COMPLETE CBC W/AUTO DIFF WBC: CPT | Performed by: INTERNAL MEDICINE

## 2018-09-28 PROCEDURE — 82962 GLUCOSE BLOOD TEST: CPT

## 2018-09-28 PROCEDURE — 25010000002 ENOXAPARIN PER 10 MG: Performed by: INTERNAL MEDICINE

## 2018-09-28 PROCEDURE — 80048 BASIC METABOLIC PNL TOTAL CA: CPT | Performed by: INTERNAL MEDICINE

## 2018-09-28 PROCEDURE — 25010000002 MORPHINE PER 10 MG: Performed by: INTERNAL MEDICINE

## 2018-09-28 RX ADMIN — MORPHINE SULFATE 1 MG: 2 INJECTION, SOLUTION INTRAMUSCULAR; INTRAVENOUS at 14:05

## 2018-09-28 RX ADMIN — FLUOXETINE HYDROCHLORIDE 40 MG: 20 CAPSULE ORAL at 09:03

## 2018-09-28 RX ADMIN — METOPROLOL TARTRATE 12.5 MG: 25 TABLET ORAL at 09:03

## 2018-09-28 RX ADMIN — CHOLECALCIFEROL TAB 10 MCG (400 UNIT) 400 UNITS: 10 TAB at 09:03

## 2018-09-28 RX ADMIN — Medication 140 MG: at 08:27

## 2018-09-28 RX ADMIN — DOCUSATE SODIUM 100 MG: 100 CAPSULE, LIQUID FILLED ORAL at 09:03

## 2018-09-28 RX ADMIN — CLOPIDOGREL 75 MG: 75 TABLET, FILM COATED ORAL at 09:03

## 2018-09-28 RX ADMIN — PANTOPRAZOLE SODIUM 40 MG: 40 TABLET, DELAYED RELEASE ORAL at 06:01

## 2018-09-28 RX ADMIN — ENOXAPARIN SODIUM 40 MG: 40 INJECTION SUBCUTANEOUS at 06:00

## 2018-09-28 RX ADMIN — CARBAMAZEPINE 200 MG: 200 TABLET ORAL at 09:03

## 2018-09-28 RX ADMIN — CLONAZEPAM 1 MG: 1 TABLET ORAL at 11:26

## 2018-09-28 RX ADMIN — HYDROCODONE BITARTRATE AND ACETAMINOPHEN 1 TABLET: 10; 325 TABLET ORAL at 09:03

## 2018-09-28 RX ADMIN — HYDROCODONE BITARTRATE AND ACETAMINOPHEN 1 TABLET: 10; 325 TABLET ORAL at 02:57

## 2018-09-28 RX ADMIN — CETIRIZINE HYDROCHLORIDE 10 MG: 10 TABLET, FILM COATED ORAL at 09:03

## 2018-09-29 ENCOUNTER — READMISSION MANAGEMENT (OUTPATIENT)
Dept: CALL CENTER | Facility: HOSPITAL | Age: 63
End: 2018-09-29

## 2018-09-29 NOTE — OUTREACH NOTE
Prep Survey      Responses   Facility patient discharged from?  [place]   Is patient eligible?  No   What are the reasons patient is not eligible?  [facility]   Does the patient have one of the following disease processes/diagnoses(primary or secondary)?  Other   Prep survey completed?  Yes          Ines Wilkinson RN

## 2018-11-06 ENCOUNTER — CLINICAL SUPPORT (OUTPATIENT)
Dept: ORTHOPEDIC SURGERY | Facility: CLINIC | Age: 63
End: 2018-11-06

## 2018-11-06 VITALS — TEMPERATURE: 97.7 F | BODY MASS INDEX: 35.44 KG/M2 | HEIGHT: 63 IN | WEIGHT: 200 LBS

## 2018-11-06 DIAGNOSIS — M17.12 ARTHRITIS OF LEFT KNEE: ICD-10-CM

## 2018-11-06 DIAGNOSIS — M25.562 ACUTE PAIN OF LEFT KNEE: ICD-10-CM

## 2018-11-06 DIAGNOSIS — M70.61 TROCHANTERIC BURSITIS, RIGHT HIP: ICD-10-CM

## 2018-11-06 DIAGNOSIS — M25.551 BILATERAL HIP PAIN: Primary | ICD-10-CM

## 2018-11-06 DIAGNOSIS — M70.62 TROCHANTERIC BURSITIS, LEFT HIP: ICD-10-CM

## 2018-11-06 DIAGNOSIS — M25.552 BILATERAL HIP PAIN: Primary | ICD-10-CM

## 2018-11-06 PROCEDURE — 20610 DRAIN/INJ JOINT/BURSA W/O US: CPT | Performed by: NURSE PRACTITIONER

## 2018-11-06 PROCEDURE — 99213 OFFICE O/P EST LOW 20 MIN: CPT | Performed by: NURSE PRACTITIONER

## 2018-11-06 RX ORDER — METHYLPREDNISOLONE ACETATE 80 MG/ML
80 INJECTION, SUSPENSION INTRA-ARTICULAR; INTRALESIONAL; INTRAMUSCULAR; SOFT TISSUE
Status: COMPLETED | OUTPATIENT
Start: 2018-11-06 | End: 2018-11-06

## 2018-11-06 RX ORDER — LIDOCAINE HYDROCHLORIDE 20 MG/ML
4 INJECTION, SOLUTION EPIDURAL; INFILTRATION; INTRACAUDAL; PERINEURAL
Status: COMPLETED | OUTPATIENT
Start: 2018-11-06 | End: 2018-11-06

## 2018-11-06 RX ADMIN — LIDOCAINE HYDROCHLORIDE 4 ML: 20 INJECTION, SOLUTION EPIDURAL; INFILTRATION; INTRACAUDAL; PERINEURAL at 14:09

## 2018-11-06 RX ADMIN — METHYLPREDNISOLONE ACETATE 80 MG: 80 INJECTION, SUSPENSION INTRA-ARTICULAR; INTRALESIONAL; INTRAMUSCULAR; SOFT TISSUE at 14:10

## 2018-11-06 RX ADMIN — METHYLPREDNISOLONE ACETATE 80 MG: 80 INJECTION, SUSPENSION INTRA-ARTICULAR; INTRALESIONAL; INTRAMUSCULAR; SOFT TISSUE at 14:09

## 2018-11-06 RX ADMIN — LIDOCAINE HYDROCHLORIDE 4 ML: 20 INJECTION, SOLUTION EPIDURAL; INFILTRATION; INTRACAUDAL; PERINEURAL at 14:10

## 2018-11-06 NOTE — PROGRESS NOTES
"      Patient: Milady Wilson  YOB: 1955  Date of Service: 11/6/2018    Chief Complaints:   Chief Complaint   Patient presents with   • Left Hip - Follow-up, Pain   • Right Hip - Follow-up, Pain   left knee pain    Subjective:    History of Present Illness: Pt is seen in the office today with complaints of   Chief Complaint   Patient presents with   • Left Hip - Follow-up, Pain   • Right Hip - Follow-up, Pain   .  HIP: TIMING:  The pain is described as ACUTE ON CHRONIC.  AGGRAVATING FACTORS:  Is worsened by prolonged standing, sitting, and walking activities.  CHARACTERISTICS:  aching, stiffness, and difficulty walking. and LOCATION: greater trochanteric tenderness. Just got out of rehab following \"bone infection in her toe\" a week ago and is achy and sore all over with bilat hip flare and left knee pain aching daily flared and not helped with ice and rest.       This problem is not new to this examiner.     Allergies: No Known Allergies    Medications:   Home Medications:  Current Outpatient Prescriptions on File Prior to Visit   Medication Sig   • acetaminophen (TYLENOL) 325 MG tablet Take 2 tablets by mouth Every 4 (Four) Hours As Needed for Mild Pain .   • B Complex-C (SUPER B COMPLEX PO) Take  by mouth Daily.   • carBAMazepine (TEGretol) 200 MG tablet Take 1 tablet by mouth 2 (Two) Times a Day.   • cholecalciferol (VITAMIN D3) 400 UNITS tablet Take 400 Units by mouth Daily.   • clonazePAM (KlonoPIN) 1 MG tablet Take 1 tablet by mouth 3 (Three) Times a Day As Needed for Anxiety.   • clopidogrel (PLAVIX) 75 MG tablet Take 1 tablet by mouth Daily.   • cyclobenzaprine (FLEXERIL) 10 MG tablet Take 1 tablet by mouth 3 (Three) Times a Day As Needed for Muscle Spasms.   • docusate sodium (COLACE) 100 MG capsule Take 100 mg by mouth 3 (three) times a day as needed for constipation.   • ferrous sulfate 140 (45 Fe) MG tablet controlled-release tablet Take 1 tablet by mouth Daily With Breakfast.   • " FLUoxetine (PROzac) 20 MG capsule Take 2 capsules by mouth Daily.   • HYDROcodone-acetaminophen (NORCO)  MG per tablet Take 2 tablets by mouth Every 4 (Four) Hours As Needed for Moderate Pain  (1=Moderate, 2=severepain).   • loperamide (IMODIUM) 2 MG capsule Take 1 capsule by mouth Every 6 (Six) Hours As Needed for Diarrhea.   • meloxicam (MOBIC) 7.5 MG tablet Take 1 tablet by mouth daily.   • metoprolol tartrate (LOPRESSOR) 25 MG tablet TAKE ONE TABLET BY MOUTH TWICE A DAY   • OLANZapine (zyPREXA) 2.5 MG tablet Take 1 tablet by mouth Every Night.   • omeprazole (priLOSEC) 40 MG capsule Take 1 capsule by mouth 2 (Two) Times a Day.   • promethazine (PHENERGAN) 25 MG tablet Take 1 tablet by mouth Every 6 (Six) Hours As Needed for Nausea or Vomiting.   • sodium chloride 0.9 % flush Infuse 20 mL into a venous catheter As Needed for Line Care (After Blood Draws or Blood Product Administration).   • sore muscle (ICY HOT EXTRA STRENGTH) 10-30 % cream cream Apply  topically As Needed.   • cefTRIAXone (ROCEPHIN) 40 MG/ML IVPB Infuse 50 mL into a venous catheter Daily for 28 doses.     No current facility-administered medications on file prior to visit.      Current Medications:  Scheduled Meds:  Continuous Infusions:  No current facility-administered medications for this visit.   PRN Meds:.    I have reviewed the patient's medical history in detail and updated the computerized patient record.  Review and summarization of old records include:    Past Medical History:   Diagnosis Date   • Anemia    • Anxiety    • Arthritis    • Atrial fibrillation (CMS/HCC)    • Bipolar disorder (CMS/HCC)    • Bleeding disorder (CMS/HCC)    • Bleeding nose    • Cataract    • Chronic fatigue    • Coronary artery disease    • Diabetes mellitus (CMS/HCC)     treatment no longer needed for DM since weight loss surgery, diet controlled   • Diverticulitis    • Gallbladder disease    • Gastric ulcer    • GERD (gastroesophageal reflux disease)     • H/O seasonal allergies    • H/O transfusion of packed red blood cells    • Hiatal hernia    • History of CT scan of abdomen 05/18/2009    NO SIGNIFICAN FINDINGS, POST CHOLECYSTECTOMY, PREVIOUS GASTRIC BYPASS AND HERNIA REPAIR   • Hyperlipidemia    • Hypertension    • Myocardial infarction (CMS/HCC)    • Peptic ulceration 2013   • Pleuritic chest pain    • Sinusitis    • Sleep apnea    • Stroke (CMS/HCC)    • TIA (transient ischemic attack)    • Vitamin D deficiency         Past Surgical History:   Procedure Laterality Date   • BILATERAL BREAST REDUCTION  1976   • CATARACT EXTRACTION  2010   • CHOLECYSTECTOMY  2005   • COLONOSCOPY  07/24/2009    TICS, NBIH   • ENDOSCOPY  07/24/2009    GASTRIC BYPASS W/ MEDIUM POUCH AND INTACT STAPLE LINE, GASTRIC ULCER W/ CLEAN BASE   • ENDOSCOPY N/A 4/8/2016    - Ulceration at the anastomosis of the gastric remnant to the jejunum.FRAGMENTS OF GASTRIC AND SMALL INTESTINAL MUCOSA WITH MILD ACUTE AND CHRONIC   • ENDOSCOPY N/A 4/29/2018    Procedure: ESOPHAGOGASTRODUODENOSCOPY;  Surgeon: Efren Mcmillan MD;  Location: [facility] ENDOSCOPY;  Service: Gastroenterology   • GASTRIC BYPASS  2002 03/01/2014   • HERNIA REPAIR  2008; 2014    Dr. Madera   • HIP FRACTURE SURGERY  10/01/2009   • JOINT REPLACEMENT Right 06/02/2011    knee   • JOINT REPLACEMENT      2009/2010   • TONSILLECTOMY  1985   • UPPER GASTROINTESTINAL ENDOSCOPY  05/06/2015    PERFORMED BY DR. ROBYN GUTIERREZ   • VASCULAR SURGERY          Social History     Occupational History   • [employer]     Social History Main Topics   • Smoking status: Never Smoker   • Smokeless tobacco: Never Used   • Alcohol use Yes      Comment: once ever 3 months   • Drug use: No   • Sexual activity: Defer      Social History     Social History Narrative   • No narrative on file        Family History   Problem Relation Age of Onset   • Colon cancer Father 69   • Other Father         POLYP   • Diabetes Father    • Other Sister 52         POLYP   • Colon cancer Paternal Grandfather    • Cancer Paternal Grandfather    • Lung cancer Maternal Grandfather    • Prostate cancer Maternal Grandfather    • Cancer Maternal Grandfather    • Cancer Maternal Uncle        ROS: 14 point review of systems was performed and was negative except for documented findings in HPI and today's encounter.     Allergies: No Known Allergies  Constitutional:  Denies fever, shaking or chills   Eyes:  Denies change in visual acuity   HENT:  Denies nasal congestion or sore throat   Respiratory:  Denies cough or shortness of breath   Cardiovascular:  Denies chest pain or severe LE edema   GI:  Denies abdominal pain, nausea, vomiting, bloody stools or diarrhea   Musculoskeletal:  Numbness, tingling, or loss of motor function only as noted above in history of present illness.  : Denies painful urination or hematuria  Integument:  Denies rash, lesion or ulceration   Neurologic:  Denies headache or focal weakness  Endocrine:  Denies lymphadenopathy  Psych:  Denies confusion or change in mental status   Hem:  Denies active bleeding      Physical Exam: 63 y.o. female  Wt Readings from Last 3 Encounters:   11/06/18 90.7 kg (200 lb)   09/28/18 92.3 kg (203 lb 6 oz)   06/29/18 74.8 kg (165 lb)       Body mass index is 35.43 kg/m².  Facility age limit for growth percentiles is 20 years.  Vitals:    11/06/18 1408   Temp: 97.7 °F (36.5 °C)     Vital signs reviewed.   General Appearance:    Alert, cooperative, in no acute distress                  Eyes: conjunctiva clear  ENT: external ears and nose atraumatic  CV: no peripheral edema  Resp: normal respiratory effort  Skin: no rashes or wounds; normal turgor  Psych: mood and affect appropriate  Lymph: no nodes appreciated  Neuro: gross sensation intact  Vascular:  Palpable peripheral pulse in noted extremity  Musculoskeletal Extremities: KNEE Exam: medial and lateral joint line tenderness with crepitation, synovitis, swelling, and joint  effusion left knee. and HIP Exam: antalgic, stiff-legged and Trendelenburg gait without assistive device right hip, left hip, Stinchfield negative, stiffness, KEN test negative, sacroileac joint tenderness  and trochanteric bursa tenderness 2+ pedal pulses and brisk capillary refill Pedal edema none has greater tenderness with her si joints today than the bursa so will try si joint injection to see if more effective.       Diagnostic Data:  Imaging done previously in the office, images were personally viewed and discussed with the patient:    Indication: pain related symptoms,  Views: 2V AP&LAT bilateral hip(s)   Findings: advanced arthritis bilat hips and arthritis bilat si joints  Comparison views: viewed last xray done in the office.      Procedure:  Large Joint Arthrocentesis  Date/Time: 11/6/2018 2:09 PM  Timeout: Immediately prior to procedure a time out was called to verify the correct patient, procedure, equipment, support staff and site/side marked as required   Supporting Documentation  Indications: pain   Procedure Details  Location: hip - L hip joint (si joint)  Needle size: 25 G  Approach: lateral  Medications administered: 4 mL lidocaine PF 2% 2 %; 80 mg methylPREDNISolone acetate 80 MG/ML  Patient tolerance: patient tolerated the procedure well with no immediate complications    Large Joint Arthrocentesis  Date/Time: 11/6/2018 2:10 PM  Procedure Details  Location: hip - R hip joint (si joint)  Needle size: 25 G  Approach: lateral  Medications administered: 4 mL lidocaine PF 2% 2 %; 80 mg methylPREDNISolone acetate 80 MG/ML  Patient tolerance: patient tolerated the procedure well with no immediate complications          Assessment:     ICD-10-CM ICD-9-CM   1. Bilateral hip pain M25.551 719.45    M25.552    2. Trochanteric bursitis, left hip M70.62 726.5   3. Trochanteric bursitis, right hip M70.61 726.5   4. Arthritis of left knee M17.12 716.96   5. Acute pain of left knee M25.562 719.46       Plan:  "  Follow up as indicated.  Ice, elevate, and rest as needed.   15 min spent face to face with patient 11 min spent counseling about natural history and expected course of assessed complaint and reviewed treatment options that have been tried and not tried and those currently available. Questions answered.    Natural history and expected course of this patient's diagnosis discussed along with evaluation of therapies. Questions answered.  Advice on benefits of, and types of regular/moderate exercise including biomechanical forces involved as it pertains to this complaint, with a goal of 5 min at least 7 times a week.    Cortisone Injection for DIAGNOSTIC and THERAPUTIC purposes.  Cryotherapy/brachy therapy as indicated with instructions.   Advised on strengthening exercises, stressed importance of these with progression of arthritis, demonstrated exercises to patient with repeat demonstration and verb of understanding.   Just got out of rehab following \"bone infection in her toe\" a week ago and is achy, weak, and sore all over with bilat hip flare. The left knee is also flared today and was wanting to get a Left TKA but needs to get this healed and cleared with infection Dr. Sutton at the end of the month.  Will set up 6wk appt for L knee cortisone to get her through while clearing infection.  Also enc doing daily exercises for her hips and knees as demonstrated and written out below to help with balance, strengthening and prehab for left knee.   Do Daily THIGH exercises sitting up straight in a supportive chair, lean forward as you do when you go to stand up and in that forward leaning position, lift the thigh slowly up and slowly down.  Give assist with your hand under the knee to lift as needed. 15x on each thigh once a day, every day.     11/6/2018  MJR  "

## 2019-02-12 ENCOUNTER — CLINICAL SUPPORT (OUTPATIENT)
Dept: ORTHOPEDIC SURGERY | Facility: CLINIC | Age: 64
End: 2019-02-12

## 2019-02-12 DIAGNOSIS — M25.551 BILATERAL HIP PAIN: Primary | ICD-10-CM

## 2019-02-12 DIAGNOSIS — M25.552 BILATERAL HIP PAIN: Primary | ICD-10-CM

## 2019-02-12 PROCEDURE — 73521 X-RAY EXAM HIPS BI 2 VIEWS: CPT | Performed by: NURSE PRACTITIONER

## 2019-02-12 NOTE — PROGRESS NOTES
**PATIENT CHECKED IN AT 3:05 PM AND HAD BILATERAL HIP XRAY'S, BUT THE PATIENT LEFT THE OFFICE BEFORE SHE WAS SEEN BY THE PROVIDER.  I ATTEMPTED TO CALL THE PATIENT AT PHONE NUMBER [phone] MULTIPLE TIMES, BUT EVERY TIME IT WENT STRAIGHT TO VOICEMAIL.

## 2019-02-12 NOTE — PROGRESS NOTES
Patient: Milady Wilson  YOB: 1955  Date of Service: 2/12/2019    Chief Complaints:   Chief Complaint   Patient presents with   • Left Hip - Follow-up, Pain   • Right Hip - Follow-up, Pain       Subjective:    History of Present Illness: Pt is seen in the office today with complaints of Chief Complaint   Patient presents with   • Left Hip - Follow-up, Pain   • Right Hip - Follow-up, Pain   .  {ERNST HPI:12741}    This problem {IS/ IS NOT:23413} new to this examiner.     Allergies: No Known Allergies    Medications:   Home Medications:  Current Outpatient Medications on File Prior to Visit   Medication Sig   • acetaminophen (TYLENOL) 325 MG tablet Take 2 tablets by mouth Every 4 (Four) Hours As Needed for Mild Pain .   • B Complex-C (SUPER B COMPLEX PO) Take  by mouth Daily.   • carBAMazepine (TEGretol) 200 MG tablet Take 1 tablet by mouth 2 (Two) Times a Day.   • cefTRIAXone (ROCEPHIN) 40 MG/ML IVPB Infuse 50 mL into a venous catheter Daily for 28 doses.   • cholecalciferol (VITAMIN D3) 400 UNITS tablet Take 400 Units by mouth Daily.   • clonazePAM (KlonoPIN) 1 MG tablet Take 1 tablet by mouth 3 (Three) Times a Day As Needed for Anxiety.   • clopidogrel (PLAVIX) 75 MG tablet Take 1 tablet by mouth Daily.   • cyclobenzaprine (FLEXERIL) 10 MG tablet Take 1 tablet by mouth 3 (Three) Times a Day As Needed for Muscle Spasms.   • docusate sodium (COLACE) 100 MG capsule Take 100 mg by mouth 3 (three) times a day as needed for constipation.   • ferrous sulfate 140 (45 Fe) MG tablet controlled-release tablet Take 1 tablet by mouth Daily With Breakfast.   • FLUoxetine (PROzac) 20 MG capsule Take 2 capsules by mouth Daily.   • HYDROcodone-acetaminophen (NORCO)  MG per tablet Take 2 tablets by mouth Every 4 (Four) Hours As Needed for Moderate Pain  (1=Moderate, 2=severepain).   • loperamide (IMODIUM) 2 MG capsule Take 1 capsule by mouth Every 6 (Six) Hours As Needed for Diarrhea.   • meloxicam (MOBIC)  7.5 MG tablet Take 1 tablet by mouth daily.   • metoprolol tartrate (LOPRESSOR) 25 MG tablet TAKE ONE TABLET BY MOUTH TWICE A DAY   • OLANZapine (zyPREXA) 2.5 MG tablet Take 1 tablet by mouth Every Night.   • omeprazole (priLOSEC) 40 MG capsule Take 1 capsule by mouth 2 (Two) Times a Day.   • promethazine (PHENERGAN) 25 MG tablet Take 1 tablet by mouth Every 6 (Six) Hours As Needed for Nausea or Vomiting.   • sodium chloride 0.9 % flush Infuse 20 mL into a venous catheter As Needed for Line Care (After Blood Draws or Blood Product Administration).   • sore muscle (ICY HOT EXTRA STRENGTH) 10-30 % cream cream Apply  topically As Needed.     No current facility-administered medications on file prior to visit.      Current Medications:  Scheduled Meds:  Continuous Infusions:  No current facility-administered medications for this visit.   PRN Meds:.    I have reviewed the patient's medical history in detail and updated the computerized patient record.  Review and summarization of old records include:    Past Medical History:   Diagnosis Date   • Anemia    • Anxiety    • Arthritis    • Atrial fibrillation (CMS/HCC)    • Bipolar disorder (CMS/HCC)    • Bleeding disorder (CMS/HCC)    • Bleeding nose    • Cataract    • Chronic fatigue    • Coronary artery disease    • Diabetes mellitus (CMS/HCC)     treatment no longer needed for DM since weight loss surgery, diet controlled   • Diverticulitis    • Gallbladder disease    • Gastric ulcer    • GERD (gastroesophageal reflux disease)    • H/O seasonal allergies    • H/O transfusion of packed red blood cells    • Hiatal hernia    • History of CT scan of abdomen 05/18/2009    NO SIGNIFICAN FINDINGS, POST CHOLECYSTECTOMY, PREVIOUS GASTRIC BYPASS AND HERNIA REPAIR   • Hyperlipidemia    • Hypertension    • Myocardial infarction (CMS/HCC)    • Peptic ulceration 2013   • Pleuritic chest pain    • Sinusitis    • Sleep apnea    • Stroke (CMS/HCC)    • TIA (transient ischemic attack)    •  Vitamin D deficiency         Past Surgical History:   Procedure Laterality Date   • BILATERAL BREAST REDUCTION  1976   • CATARACT EXTRACTION  2010   • CHOLECYSTECTOMY  2005   • COLONOSCOPY  07/24/2009    TICS, NBIH   • ENDOSCOPY  07/24/2009    GASTRIC BYPASS W/ MEDIUM POUCH AND INTACT STAPLE LINE, GASTRIC ULCER W/ CLEAN BASE   • ENDOSCOPY N/A 4/8/2016    - Ulceration at the anastomosis of the gastric remnant to the jejunum.FRAGMENTS OF GASTRIC AND SMALL INTESTINAL MUCOSA WITH MILD ACUTE AND CHRONIC   • ENDOSCOPY N/A 4/29/2018    Procedure: ESOPHAGOGASTRODUODENOSCOPY;  Surgeon: Efren Mcmillan MD;  Location: [facility] ENDOSCOPY;  Service: Gastroenterology   • GASTRIC BYPASS  2002 03/01/2014   • HERNIA REPAIR  2008; 2014    Dr. Madera   • HIP FRACTURE SURGERY  10/01/2009   • JOINT REPLACEMENT Right 06/02/2011    knee   • JOINT REPLACEMENT      2009/2010   • TONSILLECTOMY  1985   • UPPER GASTROINTESTINAL ENDOSCOPY  05/06/2015    PERFORMED BY DR. ROBYN GUTIERREZ   • VASCULAR SURGERY          Social History     Occupational History   • Occupation: [employer]     Employer: [employer]   Tobacco Use   • Smoking status: Never Smoker   • Smokeless tobacco: Never Used   Substance and Sexual Activity   • Alcohol use: Yes     Comment: once ever 3 months   • Drug use: No   • Sexual activity: Defer    Social History     Social History Narrative   • Not on file        Family History   Problem Relation Age of Onset   • Colon cancer Father 69   • Other Father         POLYP   • Diabetes Father    • Other Sister 52        POLYP   • Colon cancer Paternal Grandfather    • Cancer Paternal Grandfather    • Lung cancer Maternal Grandfather    • Prostate cancer Maternal Grandfather    • Cancer Maternal Grandfather    • Cancer Maternal Uncle        ROS: 14 point review of systems was performed and was negative except for documented findings in HPI and today's encounter.     Allergies: No Known Allergies  Constitutional:  Denies fever, shaking  or chills   Eyes:  Denies change in visual acuity   HENT:  Denies nasal congestion or sore throat   Respiratory:  Denies cough or shortness of breath   Cardiovascular:  Denies chest pain or severe LE edema   GI:  Denies abdominal pain, nausea, vomiting, bloody stools or diarrhea   Musculoskeletal:  Numbness, tingling, or loss of motor function only as noted above in history of present illness.  : Denies painful urination or hematuria  Integument:  Denies rash, lesion or ulceration   Neurologic:  Denies headache or focal weakness  Endocrine:  Denies lymphadenopathy  Psych:  Denies confusion or change in mental status   Hem:  Denies active bleeding      Physical Exam: 63 y.o. female  Wt Readings from Last 3 Encounters:   18 90.7 kg (200 lb)   18 92.3 kg (203 lb 6 oz)   18 74.8 kg (165 lb)     *** HELP TEXT ***    This SmartLink requires parameters. Parameters are variables that are added to the SmartLink name to request specific information. The parameter for .lastht is the number of readings to display.    For example: .lastht[4    In this example, the SmartLink displays the last four encounter readings.    There is no height or weight on file to calculate BMI.  No height and weight on file for this encounter.  There were no vitals filed for this visit.  Vital signs reviewed.   General Appearance:    Alert, cooperative, in no acute distress                  Eyes: conjunctiva clear  ENT: external ears and nose atraumatic  CV: no peripheral edema  Resp: normal respiratory effort  Skin: no rashes or wounds; normal turgor  Psych: mood and affect appropriate  Lymph: no nodes appreciated  Neuro: gross sensation intact  Vascular:  Palpable peripheral pulse in noted extremity  Musculoskeletal Extremities: {makkknee/hip/shoulder/back exams:46762}      Diagnostic Data:  Imaging done {[facility] timin} and discussed with the patient:    Indication: {[facility] indication:42849},  Views: {[facility] exact views:80409}  {[facility] body part:43793}   Findings: {[facility] radiology findings:99204}  Comparison views: {[facility] comparison views:20937}     Procedure:  Procedures    Assessment: No diagnosis found.    Plan:   Follow up as indicated.  Ice, elevate, and rest as needed.  {[facility] plan treatment:93391}    2/12/2019

## 2019-03-11 ENCOUNTER — TELEPHONE (OUTPATIENT)
Dept: GENERAL RADIOLOGY | Facility: HOSPITAL | Age: 64
End: 2019-03-11

## 2019-03-11 NOTE — TELEPHONE ENCOUNTER
----- Message from Nighat Pak sent at 3/11/2019  1:58 PM EDT -----  Regarding: new pt appt  It's been less then two years since Dr. Keys saw pt needs to be a 2 unit.    Thanks  Nighat

## 2019-03-11 NOTE — TELEPHONE ENCOUNTER
----- Message from Dale Keys II, MD sent at 3/11/2019  3:11 PM EDT -----   I see her on Wednesday.  I have seen her in the past.  Plan had been to see me every 6 months with labs 1 week prior: Ferritin, iron panel, CBC, reticulated hemoglobin.  She did not return for follow-up.    See if she can come in for those same labs either today or tomorrow so we will have the results by the time I see her on Wednesday.

## 2019-03-13 ENCOUNTER — APPOINTMENT (OUTPATIENT)
Dept: ONCOLOGY | Facility: CLINIC | Age: 64
End: 2019-03-13

## 2019-03-13 ENCOUNTER — APPOINTMENT (OUTPATIENT)
Dept: LAB | Facility: HOSPITAL | Age: 64
End: 2019-03-13

## 2019-03-26 NOTE — PROGRESS NOTES
"   New Patient Complaint      Patient: Milady Wilson  YOB: 1955 63 y.o. female  Medical Record Number: [id number]    Chief Complaints: My toe checked    History of Present Illness:     Patient has had a history of multiple hammertoes and was admitted last fall with ulceration over the right third toe and was felt to have osteomyelitis per bone scan.  It does not appear that she had any type of surgical treatment but was seen briefly by vascular.  She was discharged on IV antibiotics which she completed.  She has not had any recurrent ulceration or breakdown but was concerned about getting a \"recurrent infection\".  She has not had any increase in redness and swelling and has not had any drainage.    She describes mild intermittent stabbing pain with intermittent redness and swelling worse with standing and walking improved with anti-inflammatories.  She is seen here today at the request of Dr. Parkinson who she sees regularly.  She has not had any fevers or chills.  She is on Plavix for previous TIA    She is seen today at the request of Dr. Parkinson who has requested my opinion regarding etiology and treatment of this condition    HPI    Allergies: No Known Allergies    Medications:   Current Outpatient Medications on File Prior to Visit   Medication Sig   • cholecalciferol (VITAMIN D3) 400 UNITS tablet Take 400 Units by mouth Daily.   • clonazePAM (KlonoPIN) 1 MG tablet Take 1 tablet by mouth 3 (Three) Times a Day As Needed for Anxiety.   • clopidogrel (PLAVIX) 75 MG tablet Take 1 tablet by mouth Daily.   • cyclobenzaprine (FLEXERIL) 10 MG tablet Take 1 tablet by mouth 3 (Three) Times a Day As Needed for Muscle Spasms.   • FLUoxetine (PROzac) 20 MG capsule Take 2 capsules by mouth Daily.   • HYDROcodone-acetaminophen (NORCO)  MG per tablet Take 2 tablets by mouth Every 4 (Four) Hours As Needed for Moderate Pain  (1=Moderate, 2=severepain).   • metoprolol tartrate (LOPRESSOR) 25 MG tablet TAKE ONE " TABLET BY MOUTH TWICE A DAY   • traMADol (ULTRAM) 50 MG tablet Take 50 mg by mouth Every 6 (Six) Hours As Needed for Moderate Pain .   • acetaminophen (TYLENOL) 325 MG tablet Take 2 tablets by mouth Every 4 (Four) Hours As Needed for Mild Pain .   • B Complex-C (SUPER B COMPLEX PO) Take  by mouth Daily.   • carBAMazepine (TEGretol) 200 MG tablet Take 1 tablet by mouth 2 (Two) Times a Day.   • cefTRIAXone (ROCEPHIN) 40 MG/ML IVPB Infuse 50 mL into a venous catheter Daily for 28 doses.   • loperamide (IMODIUM) 2 MG capsule Take 1 capsule by mouth Every 6 (Six) Hours As Needed for Diarrhea.   • meloxicam (MOBIC) 7.5 MG tablet Take 1 tablet by mouth daily.   • OLANZapine (zyPREXA) 2.5 MG tablet Take 1 tablet by mouth Every Night.   • omeprazole (priLOSEC) 40 MG capsule Take 1 capsule by mouth 2 (Two) Times a Day.   • promethazine (PHENERGAN) 25 MG tablet Take 1 tablet by mouth Every 6 (Six) Hours As Needed for Nausea or Vomiting.   • sodium chloride 0.9 % flush Infuse 20 mL into a venous catheter As Needed for Line Care (After Blood Draws or Blood Product Administration).   • sore muscle (ICY HOT EXTRA STRENGTH) 10-30 % cream cream Apply  topically As Needed.   • [DISCONTINUED] docusate sodium (COLACE) 100 MG capsule Take 100 mg by mouth 3 (three) times a day as needed for constipation.   • [DISCONTINUED] ferrous sulfate 140 (45 Fe) MG tablet controlled-release tablet Take 1 tablet by mouth Daily With Breakfast.     No current facility-administered medications on file prior to visit.        Past Medical History:   Diagnosis Date   • Anemia    • Anxiety    • Arthritis    • Atrial fibrillation (CMS/HCC)    • Bipolar disorder (CMS/HCC)    • Bleeding disorder (CMS/HCC)    • Bleeding nose    • Cataract    • Chronic fatigue    • Coronary artery disease    • Depression    • Diabetes mellitus (CMS/HCC)     treatment no longer needed for DM since weight loss surgery, diet controlled   • Diverticulitis    • Gallbladder disease     • Gastric ulcer    • GERD (gastroesophageal reflux disease)    • H/O cardiac arrhythmia    • H/O seasonal allergies    • H/O transfusion of packed red blood cells    • Hiatal hernia    • History of CT scan of abdomen 05/18/2009    NO SIGNIFICAN FINDINGS, POST CHOLECYSTECTOMY, PREVIOUS GASTRIC BYPASS AND HERNIA REPAIR   • History of panic attacks    • History of snoring    • Hyperlipidemia    • Hypertension    • Myocardial infarction (CMS/HCC)    • Peptic ulceration 2013   • Pleuritic chest pain    • Seizures (CMS/HCC)    • Sinusitis    • Sleep apnea    • Stroke (CMS/HCC)    • TIA (transient ischemic attack) 2013   • Vitamin D deficiency      Past Surgical History:   Procedure Laterality Date   • BILATERAL BREAST REDUCTION  1976   • CATARACT EXTRACTION  2010   • CHOLECYSTECTOMY  2005   • COLONOSCOPY  07/24/2009    TICS, NBIH   • ENDOSCOPY  07/24/2009    GASTRIC BYPASS W/ MEDIUM POUCH AND INTACT STAPLE LINE, GASTRIC ULCER W/ CLEAN BASE   • ENDOSCOPY N/A 4/8/2016    - Ulceration at the anastomosis of the gastric remnant to the jejunum.FRAGMENTS OF GASTRIC AND SMALL INTESTINAL MUCOSA WITH MILD ACUTE AND CHRONIC   • ENDOSCOPY N/A 4/29/2018    Procedure: ESOPHAGOGASTRODUODENOSCOPY;  Surgeon: Efren Mcmillan MD;  Location: [facility] ENDOSCOPY;  Service: Gastroenterology   • GASTRIC BYPASS  2002    03/01/2014   • HERNIA REPAIR  2008; 2014    Dr. Madera   • HERNIA REPAIR  2003   • HIP FRACTURE SURGERY  10/01/2009   • JOINT REPLACEMENT Right 06/02/2011    knee   • JOINT REPLACEMENT      2009/2010   • TONSILLECTOMY  1985   • UPPER GASTROINTESTINAL ENDOSCOPY  05/06/2015    PERFORMED BY DR. ROBYN GUTIERREZ   • VASCULAR SURGERY       Social History     Occupational History   • Occupation: [employer]     Employer: DISABLED   Tobacco Use   • Smoking status: Never Smoker   • Smokeless tobacco: Never Used   Substance and Sexual Activity   • Alcohol use: Yes     Comment: once every 3 months   • Drug use: No   • Sexual activity: Defer  "     Social History     Social History Narrative   • Not on file     Family History   Problem Relation Age of Onset   • Colon cancer Father 69   • Other Father         POLYP   • Diabetes Father    • Other Sister 52        POLYP   • Colon cancer Paternal Grandfather    • Cancer Paternal Grandfather    • Lung cancer Maternal Grandfather    • Prostate cancer Maternal Grandfather    • Cancer Maternal Grandfather    • Cancer Maternal Uncle    • Cancer Maternal Grandmother    • Hypertension Other    • Diabetes Other    • Bone cancer Other        Review of Systems: 14 point review of systems performed, positive pertinent findings identified in HPI. All remaining systems negative except night sweats, fainting spells, irregular heartbeat, breathing difficulties, nausea or vomiting, headaches or migraines, anxiety or depression, numbness and tingling, dizziness, mood swings, bruising easily, hayfever    Review of Systems      Physical Exam:   Vitals:    03/27/19 1059   Temp: 97.9 °F (36.6 °C)   Weight: 90.7 kg (200 lb)   Height: 160 cm (63\")     Physical Exam   Constitutional: pleasant, well developed   Eyes: sclera non icteric  Hearing : adequate for exam  Cardiovascular: palpable pulses in right foot, right calf/ thigh NT without sign of DVT  Respiratoy: breathing unlabored   Neurological: grossly sensate to LT throughout right LE  Psychiatric: oriented with normal mood and affect.   Lymphatic: No palpable popliteal lymphadenopathy right LE  Skin: intact throughout right leg/foot  Musculoskeletal: Right foot shows very slight hallux valgus.  There is flexion deformities of the lesser toes most pronounced of the second more so than third with slight cockup deformity of the MTP joints is passively correctable to neutral with minimal discomfort beneath the metatarsal heads.  There is no callus or breakdown beneath the metatarsal heads.  There is some very superficial appearing callus over the distal aspect of the second toe.  " "Toes are grossly sensate to light touch.  There was only slight swelling no appreciable erythema  Physical Exam  Ortho Exam    Radiology: 3 views of the right foot ordered to evaluate pain reviewed and no prior x-rays available for comparison these show very mild hallux valgus at the first MTP joint with some valgus through the proximal phalanx.  There is relative elongation of the second and third metatarsals with cockup deformity of the second and third more so than the fourth and fifth MTP joint with under curling of the fourth and third as well as the second PIP.  There does appear to be some questionable bony loss over the distal aspect of the second toe distal phalanx but no soft tissue gas or obvious lytic lesion    Assessment/Plan: 1.  Right hallux valgus  2.  Multiple right foot hammertoes  3.  Recurrent callus right second toe without clinical sign of ulcer or infection at this time.    Reviewed with her that I do not see active sign of infection.  She does not desire any surgery at this point and will be difficult with her being on Plavix.    We discussed possibly getting an MRI for further evaluation to make sure there is not any obvious bony infection at this time but she wants to hold off on this.    She was fitted with a \"toe crest\" pad and will send her for over-the-counter extra-depth shoes and total contact inlays.  She understands to check this toe several times daily and if anything worsens to let me or Dr. Parkinson know.  Otherwise I will see her back in approximately 6 weeks with x-rays of her right foot.    She understands to see her primary care physician for any non-orthopedic related issues outlined in review of symptoms  "

## 2019-03-27 ENCOUNTER — CONSULT (OUTPATIENT)
Dept: ORTHOPEDIC SURGERY | Facility: CLINIC | Age: 64
End: 2019-03-27

## 2019-03-27 VITALS — BODY MASS INDEX: 35.44 KG/M2 | TEMPERATURE: 97.9 F | WEIGHT: 200 LBS | HEIGHT: 63 IN

## 2019-03-27 DIAGNOSIS — M20.41 HAMMER TOE OF RIGHT FOOT: ICD-10-CM

## 2019-03-27 DIAGNOSIS — M79.671 FOOT PAIN, RIGHT: Primary | ICD-10-CM

## 2019-03-27 DIAGNOSIS — M77.41 METATARSALGIA OF RIGHT FOOT: ICD-10-CM

## 2019-03-27 DIAGNOSIS — L84 FOOT CALLUS: ICD-10-CM

## 2019-03-27 PROCEDURE — 99214 OFFICE O/P EST MOD 30 MIN: CPT | Performed by: ORTHOPAEDIC SURGERY

## 2019-03-27 PROCEDURE — 73630 X-RAY EXAM OF FOOT: CPT | Performed by: ORTHOPAEDIC SURGERY

## 2019-03-27 RX ORDER — TRAMADOL HYDROCHLORIDE 50 MG/1
50 TABLET ORAL EVERY 6 HOURS PRN
COMMUNITY

## 2019-05-22 ENCOUNTER — APPOINTMENT (OUTPATIENT)
Dept: ONCOLOGY | Facility: CLINIC | Age: 64
End: 2019-05-22

## 2019-05-22 ENCOUNTER — APPOINTMENT (OUTPATIENT)
Dept: LAB | Facility: HOSPITAL | Age: 64
End: 2019-05-22

## 2019-07-02 DIAGNOSIS — D50.8 IRON DEFICIENCY ANEMIA SECONDARY TO INADEQUATE DIETARY IRON INTAKE: Primary | ICD-10-CM

## 2019-07-02 DIAGNOSIS — D50.0 IRON DEFICIENCY ANEMIA DUE TO CHRONIC BLOOD LOSS: ICD-10-CM

## 2019-07-02 NOTE — PROGRESS NOTES
Orders entered for CBC, Ferritin, Retic. Hgb, and Iron Profile to be drawn STAT at appointment on 7/3/19.  Call to patient to see if she can come in today, 7/2/19, for lab draws, or come in early on 7/3/19 for appointment, in order to have results by appointment  per request from Dr. Keys.  Message left and waiting return call 7/2/19 1:03 PM

## (undated) DEVICE — TBG 02 CRUSH RESIST LF CLR 7FT

## (undated) DEVICE — BITEBLOCK OMNI BLOC

## (undated) DEVICE — CANN NASL CO2 TRULINK W/O2 A/

## (undated) DEVICE — Device: Brand: DEFENDO AIR/WATER/SUCTION AND BIOPSY VALVE

## (undated) DEVICE — TUBING, SUCTION, 1/4" X 10', STRAIGHT: Brand: MEDLINE